# Patient Record
Sex: FEMALE | Race: BLACK OR AFRICAN AMERICAN | Employment: PART TIME | URBAN - METROPOLITAN AREA
[De-identification: names, ages, dates, MRNs, and addresses within clinical notes are randomized per-mention and may not be internally consistent; named-entity substitution may affect disease eponyms.]

---

## 2019-05-09 ENCOUNTER — ANESTHESIA (OUTPATIENT)
Dept: SURGERY | Age: 49
DRG: 340 | End: 2019-05-09
Payer: MEDICARE

## 2019-05-09 ENCOUNTER — HOSPITAL ENCOUNTER (INPATIENT)
Age: 49
LOS: 4 days | Discharge: HOME | DRG: 340 | End: 2019-05-13
Attending: EMERGENCY MEDICINE
Payer: MEDICARE

## 2019-05-09 ENCOUNTER — APPOINTMENT (OUTPATIENT)
Dept: CT IMAGING | Age: 49
DRG: 340 | End: 2019-05-09
Attending: PHYSICIAN ASSISTANT
Payer: MEDICARE

## 2019-05-09 ENCOUNTER — ANESTHESIA EVENT (OUTPATIENT)
Dept: SURGERY | Age: 49
DRG: 340 | End: 2019-05-09
Payer: MEDICARE

## 2019-05-09 ENCOUNTER — APPOINTMENT (OUTPATIENT)
Dept: GENERAL RADIOLOGY | Age: 49
DRG: 340 | End: 2019-05-09
Attending: PHYSICIAN ASSISTANT
Payer: MEDICARE

## 2019-05-09 DIAGNOSIS — K35.30 ACUTE APPENDICITIS WITH LOCALIZED PERITONITIS, WITHOUT PERFORATION, ABSCESS, OR GANGRENE: Primary | ICD-10-CM

## 2019-05-09 PROBLEM — K37 APPENDICITIS: Status: ACTIVE | Noted: 2019-05-09

## 2019-05-09 LAB
ALBUMIN SERPL-MCNC: 3.5 G/DL (ref 3.4–5)
ALBUMIN/GLOB SERPL: 0.7 {RATIO} (ref 0.8–1.7)
ALP SERPL-CCNC: 93 U/L (ref 45–117)
ALT SERPL-CCNC: 20 U/L (ref 13–56)
ANION GAP SERPL CALC-SCNC: 4 MMOL/L (ref 3–18)
AST SERPL-CCNC: 25 U/L (ref 15–37)
BASOPHILS # BLD: 0 K/UL (ref 0–0.1)
BASOPHILS NFR BLD: 0 % (ref 0–2)
BILIRUB SERPL-MCNC: 0.7 MG/DL (ref 0.2–1)
BUN SERPL-MCNC: 18 MG/DL (ref 7–18)
BUN/CREAT SERPL: 19 (ref 12–20)
CALCIUM SERPL-MCNC: 9.9 MG/DL (ref 8.5–10.1)
CHLORIDE SERPL-SCNC: 105 MMOL/L (ref 100–108)
CO2 SERPL-SCNC: 28 MMOL/L (ref 21–32)
CREAT SERPL-MCNC: 0.94 MG/DL (ref 0.6–1.3)
DIFFERENTIAL METHOD BLD: ABNORMAL
EOSINOPHIL # BLD: 0.1 K/UL (ref 0–0.4)
EOSINOPHIL NFR BLD: 1 % (ref 0–5)
ERYTHROCYTE [DISTWIDTH] IN BLOOD BY AUTOMATED COUNT: 14.2 % (ref 11.6–14.5)
EST. AVERAGE GLUCOSE BLD GHB EST-MCNC: 123 MG/DL
GLOBULIN SER CALC-MCNC: 4.7 G/DL (ref 2–4)
GLUCOSE BLD STRIP.AUTO-MCNC: 96 MG/DL (ref 70–110)
GLUCOSE SERPL-MCNC: 91 MG/DL (ref 74–99)
HBA1C MFR BLD: 5.9 % (ref 4.2–5.6)
HCT VFR BLD AUTO: 36.4 % (ref 35–45)
HGB BLD-MCNC: 12.3 G/DL (ref 12–16)
LACTATE BLD-SCNC: 0.98 MMOL/L (ref 0.4–2)
LIPASE SERPL-CCNC: 74 U/L (ref 73–393)
LYMPHOCYTES # BLD: 1.7 K/UL (ref 0.9–3.6)
LYMPHOCYTES NFR BLD: 14 % (ref 21–52)
MCH RBC QN AUTO: 28.1 PG (ref 24–34)
MCHC RBC AUTO-ENTMCNC: 33.8 G/DL (ref 31–37)
MCV RBC AUTO: 83.1 FL (ref 74–97)
MONOCYTES # BLD: 0.7 K/UL (ref 0.05–1.2)
MONOCYTES NFR BLD: 6 % (ref 3–10)
NEUTS SEG # BLD: 9.7 K/UL (ref 1.8–8)
NEUTS SEG NFR BLD: 79 % (ref 40–73)
PLATELET # BLD AUTO: 270 K/UL (ref 135–420)
PMV BLD AUTO: 9.9 FL (ref 9.2–11.8)
POTASSIUM SERPL-SCNC: 4.4 MMOL/L (ref 3.5–5.5)
PROT SERPL-MCNC: 8.2 G/DL (ref 6.4–8.2)
RBC # BLD AUTO: 4.38 M/UL (ref 4.2–5.3)
SODIUM SERPL-SCNC: 137 MMOL/L (ref 136–145)
WBC # BLD AUTO: 12.2 K/UL (ref 4.6–13.2)

## 2019-05-09 PROCEDURE — 77030018846 HC SOL IRR STRL H20 ICUM -A

## 2019-05-09 PROCEDURE — 77030008683 HC TU ET CUF COVD -A: Performed by: ANESTHESIOLOGY

## 2019-05-09 PROCEDURE — 77030009967 HC RELD STPLR ENDOSC J&J -C

## 2019-05-09 PROCEDURE — 85025 COMPLETE CBC W/AUTO DIFF WBC: CPT

## 2019-05-09 PROCEDURE — 74011250636 HC RX REV CODE- 250/636: Performed by: PHYSICIAN ASSISTANT

## 2019-05-09 PROCEDURE — 74177 CT ABD & PELVIS W/CONTRAST: CPT

## 2019-05-09 PROCEDURE — 77030002916 HC SUT ETHLN J&J -A

## 2019-05-09 PROCEDURE — 74011250636 HC RX REV CODE- 250/636

## 2019-05-09 PROCEDURE — 74011000258 HC RX REV CODE- 258: Performed by: PHYSICIAN ASSISTANT

## 2019-05-09 PROCEDURE — 74011636320 HC RX REV CODE- 636/320: Performed by: EMERGENCY MEDICINE

## 2019-05-09 PROCEDURE — 77030026438 HC STYL ET INTUB CARD -A: Performed by: ANESTHESIOLOGY

## 2019-05-09 PROCEDURE — 77030012406 HC DRN WND PENRS BARD -A

## 2019-05-09 PROCEDURE — 77030011265 HC ELECTRD BLD HEX COVD -A

## 2019-05-09 PROCEDURE — 77030009973 HC RELD STPLR J&J -C

## 2019-05-09 PROCEDURE — 93005 ELECTROCARDIOGRAM TRACING: CPT

## 2019-05-09 PROCEDURE — 77030011296 HC FCPS GRSP ENDOSC J&J -B

## 2019-05-09 PROCEDURE — 88304 TISSUE EXAM BY PATHOLOGIST: CPT

## 2019-05-09 PROCEDURE — 77030008566 HC TBNG SUC IRR J&J -B

## 2019-05-09 PROCEDURE — 76210000006 HC OR PH I REC 0.5 TO 1 HR

## 2019-05-09 PROCEDURE — 77030010351 HC TRCR ENDOSC VSTP COVD -B

## 2019-05-09 PROCEDURE — 83036 HEMOGLOBIN GLYCOSYLATED A1C: CPT

## 2019-05-09 PROCEDURE — 77030002933 HC SUT MCRYL J&J -A

## 2019-05-09 PROCEDURE — 74011000250 HC RX REV CODE- 250

## 2019-05-09 PROCEDURE — 77030020782 HC GWN BAIR PAWS FLX 3M -B

## 2019-05-09 PROCEDURE — 77030035220 HC TRCR ENDOSC BLNTPRT ANCHR COVD -B

## 2019-05-09 PROCEDURE — 83605 ASSAY OF LACTIC ACID: CPT

## 2019-05-09 PROCEDURE — 80053 COMPREHEN METABOLIC PANEL: CPT

## 2019-05-09 PROCEDURE — 82962 GLUCOSE BLOOD TEST: CPT

## 2019-05-09 PROCEDURE — 87040 BLOOD CULTURE FOR BACTERIA: CPT

## 2019-05-09 PROCEDURE — 0DTJ4ZZ RESECTION OF APPENDIX, PERCUTANEOUS ENDOSCOPIC APPROACH: ICD-10-PCS

## 2019-05-09 PROCEDURE — 65660000000 HC RM CCU STEPDOWN

## 2019-05-09 PROCEDURE — 77030013567 HC DRN WND RESERV BARD -A

## 2019-05-09 PROCEDURE — 77030008756 HC TU IRR SUC STRY -B

## 2019-05-09 PROCEDURE — 77030037051 HC TRCR ENDOSC VRSPRT BLD COVD -B

## 2019-05-09 PROCEDURE — 99283 EMERGENCY DEPT VISIT LOW MDM: CPT

## 2019-05-09 PROCEDURE — 76060000033 HC ANESTHESIA 1 TO 1.5 HR

## 2019-05-09 PROCEDURE — 77030031139 HC SUT VCRL2 J&J -A

## 2019-05-09 PROCEDURE — 77030020255 HC SOL INJ LR 1000ML BG

## 2019-05-09 PROCEDURE — 99284 EMERGENCY DEPT VISIT MOD MDM: CPT

## 2019-05-09 PROCEDURE — 77030034850

## 2019-05-09 PROCEDURE — 83690 ASSAY OF LIPASE: CPT

## 2019-05-09 PROCEDURE — 77030037892

## 2019-05-09 PROCEDURE — 77030018706 HC CORD MPLR COVD -A

## 2019-05-09 PROCEDURE — 76010000149 HC OR TIME 1 TO 1.5 HR

## 2019-05-09 PROCEDURE — 71045 X-RAY EXAM CHEST 1 VIEW: CPT

## 2019-05-09 PROCEDURE — 77030011810 HC STPLR ENDOSC J&J -G

## 2019-05-09 RX ORDER — ROCURONIUM BROMIDE 10 MG/ML
INJECTION, SOLUTION INTRAVENOUS AS NEEDED
Status: DISCONTINUED | OUTPATIENT
Start: 2019-05-09 | End: 2019-05-09 | Stop reason: HOSPADM

## 2019-05-09 RX ORDER — ACETAMINOPHEN 325 MG/1
650 TABLET ORAL
Status: DISCONTINUED | OUTPATIENT
Start: 2019-05-09 | End: 2019-05-13 | Stop reason: HOSPADM

## 2019-05-09 RX ORDER — MORPHINE SULFATE 2 MG/ML
INJECTION, SOLUTION INTRAMUSCULAR; INTRAVENOUS AS NEEDED
Status: DISCONTINUED | OUTPATIENT
Start: 2019-05-09 | End: 2019-05-09 | Stop reason: HOSPADM

## 2019-05-09 RX ORDER — BUPROPION HYDROCHLORIDE 150 MG/1
150 TABLET ORAL
COMMUNITY

## 2019-05-09 RX ORDER — LIDOCAINE HYDROCHLORIDE 20 MG/ML
INJECTION, SOLUTION EPIDURAL; INFILTRATION; INTRACAUDAL; PERINEURAL AS NEEDED
Status: DISCONTINUED | OUTPATIENT
Start: 2019-05-09 | End: 2019-05-09 | Stop reason: HOSPADM

## 2019-05-09 RX ORDER — ONDANSETRON 2 MG/ML
INJECTION INTRAMUSCULAR; INTRAVENOUS AS NEEDED
Status: DISCONTINUED | OUTPATIENT
Start: 2019-05-09 | End: 2019-05-09 | Stop reason: HOSPADM

## 2019-05-09 RX ORDER — MAGNESIUM SULFATE 100 %
4 CRYSTALS MISCELLANEOUS AS NEEDED
Status: DISCONTINUED | OUTPATIENT
Start: 2019-05-09 | End: 2019-05-13 | Stop reason: HOSPADM

## 2019-05-09 RX ORDER — HYDROMORPHONE HYDROCHLORIDE 2 MG/ML
0.5 INJECTION, SOLUTION INTRAMUSCULAR; INTRAVENOUS; SUBCUTANEOUS
Status: DISCONTINUED | OUTPATIENT
Start: 2019-05-09 | End: 2019-05-09 | Stop reason: HOSPADM

## 2019-05-09 RX ORDER — SODIUM CHLORIDE, SODIUM LACTATE, POTASSIUM CHLORIDE, CALCIUM CHLORIDE 600; 310; 30; 20 MG/100ML; MG/100ML; MG/100ML; MG/100ML
75 INJECTION, SOLUTION INTRAVENOUS CONTINUOUS
Status: DISCONTINUED | OUTPATIENT
Start: 2019-05-09 | End: 2019-05-09 | Stop reason: HOSPADM

## 2019-05-09 RX ORDER — SODIUM CHLORIDE, SODIUM LACTATE, POTASSIUM CHLORIDE, CALCIUM CHLORIDE 600; 310; 30; 20 MG/100ML; MG/100ML; MG/100ML; MG/100ML
INJECTION, SOLUTION INTRAVENOUS
Status: DISCONTINUED | OUTPATIENT
Start: 2019-05-09 | End: 2019-05-09 | Stop reason: HOSPADM

## 2019-05-09 RX ORDER — DIPHENHYDRAMINE HYDROCHLORIDE 50 MG/ML
12.5 INJECTION, SOLUTION INTRAMUSCULAR; INTRAVENOUS
Status: DISCONTINUED | OUTPATIENT
Start: 2019-05-09 | End: 2019-05-13 | Stop reason: HOSPADM

## 2019-05-09 RX ORDER — KETOROLAC TROMETHAMINE 30 MG/ML
INJECTION, SOLUTION INTRAMUSCULAR; INTRAVENOUS AS NEEDED
Status: DISCONTINUED | OUTPATIENT
Start: 2019-05-09 | End: 2019-05-09 | Stop reason: HOSPADM

## 2019-05-09 RX ORDER — MORPHINE SULFATE 10 MG/ML
5 INJECTION, SOLUTION INTRAMUSCULAR; INTRAVENOUS
Status: DISCONTINUED | OUTPATIENT
Start: 2019-05-09 | End: 2019-05-13 | Stop reason: HOSPADM

## 2019-05-09 RX ORDER — NEOSTIGMINE METHYLSULFATE 5 MG/5 ML
SYRINGE (ML) INTRAVENOUS AS NEEDED
Status: DISCONTINUED | OUTPATIENT
Start: 2019-05-09 | End: 2019-05-09 | Stop reason: HOSPADM

## 2019-05-09 RX ORDER — PROPOFOL 10 MG/ML
INJECTION, EMULSION INTRAVENOUS AS NEEDED
Status: DISCONTINUED | OUTPATIENT
Start: 2019-05-09 | End: 2019-05-09 | Stop reason: HOSPADM

## 2019-05-09 RX ORDER — DEXTROSE 50 % IN WATER (D50W) INTRAVENOUS SYRINGE
25-50 AS NEEDED
Status: DISCONTINUED | OUTPATIENT
Start: 2019-05-09 | End: 2019-05-13 | Stop reason: HOSPADM

## 2019-05-09 RX ORDER — METRONIDAZOLE 500 MG/100ML
500 INJECTION, SOLUTION INTRAVENOUS EVERY 8 HOURS
Status: DISCONTINUED | OUTPATIENT
Start: 2019-05-09 | End: 2019-05-13 | Stop reason: HOSPADM

## 2019-05-09 RX ORDER — CIPROFLOXACIN 2 MG/ML
400 INJECTION, SOLUTION INTRAVENOUS EVERY 12 HOURS
Status: DISCONTINUED | OUTPATIENT
Start: 2019-05-09 | End: 2019-05-13 | Stop reason: HOSPADM

## 2019-05-09 RX ORDER — CEFAZOLIN SODIUM 1 G/3ML
INJECTION, POWDER, FOR SOLUTION INTRAMUSCULAR; INTRAVENOUS AS NEEDED
Status: DISCONTINUED | OUTPATIENT
Start: 2019-05-09 | End: 2019-05-09 | Stop reason: HOSPADM

## 2019-05-09 RX ORDER — MIDAZOLAM HYDROCHLORIDE 1 MG/ML
INJECTION, SOLUTION INTRAMUSCULAR; INTRAVENOUS AS NEEDED
Status: DISCONTINUED | OUTPATIENT
Start: 2019-05-09 | End: 2019-05-09 | Stop reason: HOSPADM

## 2019-05-09 RX ORDER — SODIUM CHLORIDE 0.9 % (FLUSH) 0.9 %
5-40 SYRINGE (ML) INJECTION AS NEEDED
Status: DISCONTINUED | OUTPATIENT
Start: 2019-05-09 | End: 2019-05-09 | Stop reason: HOSPADM

## 2019-05-09 RX ORDER — GLYCOPYRROLATE 0.2 MG/ML
INJECTION INTRAMUSCULAR; INTRAVENOUS AS NEEDED
Status: DISCONTINUED | OUTPATIENT
Start: 2019-05-09 | End: 2019-05-09 | Stop reason: HOSPADM

## 2019-05-09 RX ORDER — INSULIN LISPRO 100 [IU]/ML
INJECTION, SOLUTION INTRAVENOUS; SUBCUTANEOUS
Status: DISCONTINUED | OUTPATIENT
Start: 2019-05-09 | End: 2019-05-13 | Stop reason: HOSPADM

## 2019-05-09 RX ORDER — OXYCODONE AND ACETAMINOPHEN 5; 325 MG/1; MG/1
1 TABLET ORAL
Status: DISCONTINUED | OUTPATIENT
Start: 2019-05-09 | End: 2019-05-13 | Stop reason: HOSPADM

## 2019-05-09 RX ORDER — DEXAMETHASONE SODIUM PHOSPHATE 4 MG/ML
INJECTION, SOLUTION INTRA-ARTICULAR; INTRALESIONAL; INTRAMUSCULAR; INTRAVENOUS; SOFT TISSUE AS NEEDED
Status: DISCONTINUED | OUTPATIENT
Start: 2019-05-09 | End: 2019-05-09 | Stop reason: HOSPADM

## 2019-05-09 RX ORDER — LORAZEPAM 2 MG/ML
1 INJECTION INTRAMUSCULAR
Status: DISCONTINUED | OUTPATIENT
Start: 2019-05-09 | End: 2019-05-13 | Stop reason: HOSPADM

## 2019-05-09 RX ORDER — FENTANYL CITRATE 50 UG/ML
INJECTION, SOLUTION INTRAMUSCULAR; INTRAVENOUS AS NEEDED
Status: DISCONTINUED | OUTPATIENT
Start: 2019-05-09 | End: 2019-05-09 | Stop reason: HOSPADM

## 2019-05-09 RX ORDER — DEXTROSE 50 % IN WATER (D50W) INTRAVENOUS SYRINGE
25-50 AS NEEDED
Status: DISCONTINUED | OUTPATIENT
Start: 2019-05-09 | End: 2019-05-09 | Stop reason: HOSPADM

## 2019-05-09 RX ORDER — ONDANSETRON 2 MG/ML
4 INJECTION INTRAMUSCULAR; INTRAVENOUS ONCE
Status: DISCONTINUED | OUTPATIENT
Start: 2019-05-09 | End: 2019-05-09 | Stop reason: HOSPADM

## 2019-05-09 RX ORDER — NALOXONE HYDROCHLORIDE 0.4 MG/ML
0.2 INJECTION, SOLUTION INTRAMUSCULAR; INTRAVENOUS; SUBCUTANEOUS AS NEEDED
Status: DISCONTINUED | OUTPATIENT
Start: 2019-05-09 | End: 2019-05-09 | Stop reason: HOSPADM

## 2019-05-09 RX ORDER — DEXTROSE, SODIUM CHLORIDE, AND POTASSIUM CHLORIDE 5; .45; .15 G/100ML; G/100ML; G/100ML
125 INJECTION INTRAVENOUS CONTINUOUS
Status: DISCONTINUED | OUTPATIENT
Start: 2019-05-09 | End: 2019-05-10

## 2019-05-09 RX ORDER — MAGNESIUM SULFATE 100 %
4 CRYSTALS MISCELLANEOUS AS NEEDED
Status: DISCONTINUED | OUTPATIENT
Start: 2019-05-09 | End: 2019-05-09 | Stop reason: HOSPADM

## 2019-05-09 RX ORDER — SODIUM CHLORIDE 0.9 % (FLUSH) 0.9 %
5-10 SYRINGE (ML) INJECTION AS NEEDED
Status: DISCONTINUED | OUTPATIENT
Start: 2019-05-09 | End: 2019-05-13 | Stop reason: HOSPADM

## 2019-05-09 RX ORDER — SUCCINYLCHOLINE CHLORIDE 20 MG/ML
INJECTION INTRAMUSCULAR; INTRAVENOUS AS NEEDED
Status: DISCONTINUED | OUTPATIENT
Start: 2019-05-09 | End: 2019-05-09 | Stop reason: HOSPADM

## 2019-05-09 RX ORDER — SODIUM CHLORIDE 0.9 % (FLUSH) 0.9 %
5-40 SYRINGE (ML) INJECTION EVERY 8 HOURS
Status: DISCONTINUED | OUTPATIENT
Start: 2019-05-09 | End: 2019-05-09 | Stop reason: HOSPADM

## 2019-05-09 RX ORDER — BUPIVACAINE HYDROCHLORIDE 2.5 MG/ML
INJECTION, SOLUTION EPIDURAL; INFILTRATION; INTRACAUDAL AS NEEDED
Status: DISCONTINUED | OUTPATIENT
Start: 2019-05-09 | End: 2019-05-09 | Stop reason: HOSPADM

## 2019-05-09 RX ADMIN — CIPROFLOXACIN 400 MG: 2 INJECTION, SOLUTION INTRAVENOUS at 23:38

## 2019-05-09 RX ADMIN — SUCCINYLCHOLINE CHLORIDE 120 MG: 20 INJECTION INTRAMUSCULAR; INTRAVENOUS at 20:14

## 2019-05-09 RX ADMIN — IOPAMIDOL 100 ML: 612 INJECTION, SOLUTION INTRAVENOUS at 17:50

## 2019-05-09 RX ADMIN — SODIUM CHLORIDE, SODIUM LACTATE, POTASSIUM CHLORIDE, CALCIUM CHLORIDE: 600; 310; 30; 20 INJECTION, SOLUTION INTRAVENOUS at 20:10

## 2019-05-09 RX ADMIN — PROPOFOL 160 MG: 10 INJECTION, EMULSION INTRAVENOUS at 20:14

## 2019-05-09 RX ADMIN — PIPERACILLIN SODIUM,TAZOBACTAM SODIUM 3.38 G: 3; .375 INJECTION, POWDER, FOR SOLUTION INTRAVENOUS at 18:54

## 2019-05-09 RX ADMIN — ROCURONIUM BROMIDE 5 MG: 10 INJECTION, SOLUTION INTRAVENOUS at 20:13

## 2019-05-09 RX ADMIN — ROCURONIUM BROMIDE 15 MG: 10 INJECTION, SOLUTION INTRAVENOUS at 20:25

## 2019-05-09 RX ADMIN — MIDAZOLAM HYDROCHLORIDE 2 MG: 1 INJECTION, SOLUTION INTRAMUSCULAR; INTRAVENOUS at 20:07

## 2019-05-09 RX ADMIN — ONDANSETRON 4 MG: 2 INJECTION INTRAMUSCULAR; INTRAVENOUS at 20:07

## 2019-05-09 RX ADMIN — FENTANYL CITRATE 50 MCG: 50 INJECTION, SOLUTION INTRAMUSCULAR; INTRAVENOUS at 20:13

## 2019-05-09 RX ADMIN — GLYCOPYRROLATE 0.3 MG: 0.2 INJECTION INTRAMUSCULAR; INTRAVENOUS at 21:04

## 2019-05-09 RX ADMIN — LIDOCAINE HYDROCHLORIDE 80 MG: 20 INJECTION, SOLUTION EPIDURAL; INFILTRATION; INTRACAUDAL; PERINEURAL at 20:14

## 2019-05-09 RX ADMIN — CEFAZOLIN SODIUM 2 G: 1 INJECTION, POWDER, FOR SOLUTION INTRAMUSCULAR; INTRAVENOUS at 20:10

## 2019-05-09 RX ADMIN — DEXAMETHASONE SODIUM PHOSPHATE 4 MG: 4 INJECTION, SOLUTION INTRA-ARTICULAR; INTRALESIONAL; INTRAMUSCULAR; INTRAVENOUS; SOFT TISSUE at 20:25

## 2019-05-09 RX ADMIN — Medication 2 MG: at 21:04

## 2019-05-09 RX ADMIN — MORPHINE SULFATE 4 MG: 2 INJECTION, SOLUTION INTRAMUSCULAR; INTRAVENOUS at 21:09

## 2019-05-09 RX ADMIN — GLYCOPYRROLATE 0.2 MG: 0.2 INJECTION INTRAMUSCULAR; INTRAVENOUS at 20:07

## 2019-05-09 RX ADMIN — SODIUM CHLORIDE, SODIUM LACTATE, POTASSIUM CHLORIDE, CALCIUM CHLORIDE: 600; 310; 30; 20 INJECTION, SOLUTION INTRAVENOUS at 20:59

## 2019-05-09 RX ADMIN — KETOROLAC TROMETHAMINE 30 MG: 30 INJECTION, SOLUTION INTRAMUSCULAR; INTRAVENOUS at 21:01

## 2019-05-09 RX ADMIN — METRONIDAZOLE 500 MG: 500 INJECTION, SOLUTION INTRAVENOUS at 22:35

## 2019-05-09 RX ADMIN — FENTANYL CITRATE 50 MCG: 50 INJECTION, SOLUTION INTRAMUSCULAR; INTRAVENOUS at 20:59

## 2019-05-09 RX ADMIN — SODIUM CHLORIDE 1000 ML: 900 INJECTION, SOLUTION INTRAVENOUS at 18:09

## 2019-05-09 NOTE — ED PROVIDER NOTES
[facility] EMERGENCY DEPT 
 
 
5:13 PM 
 
Date: 5/9/2019 Patient Name: Lindsay Ha History of Presenting Illness Chief Complaint Patient presents with  Abdominal Pain  Diarrhea  Chills  Vomiting 54 y.o. female with noted past medical history including gastric sleeve surgery, NIDDM, and Hypertension who presents to the emergency department with complaints of intermittent stabbing lower abdominal pain x 4 days with associated yellow vomiting and loose dark brown stool. Pain is exacerbated with inspiration and lying flat. She also notes a colicky pain worse with eating. She reports subjective fevers and chills. She denies hematuria, hematochezia, and recent travel. Patient denies any other associated signs or symptoms. Patient denies any other complaints. Nursing notes regarding the HPI and triage nursing notes were reviewed. Prior medical records were reviewed. Review of Systems Constitutional: Positive for chills and fever. HENT: Negative. Respiratory: Negative for shortness of breath and wheezing. Cardiovascular: Negative for chest pain and palpitations. Gastrointestinal: Positive for abdominal pain, diarrhea, nausea and vomiting. Negative for blood in stool. Genitourinary: Negative. Musculoskeletal: Negative. Neurological: Negative. Current Facility-Administered Medications Medication Dose Route Frequency Provider Last Rate Last Dose  sodium chloride (NS) flush 5-10 mL  5-10 mL IntraVENous PRN SUSAN Juarez      
 sodium chloride 0.9 % bolus infusion 244 mL  244 mL IntraVENous ONCE Haleigh Denny PA      
 piperacillin-tazobactam (ZOSYN) 3.375 g in 0.9% sodium chloride (MBP/ADV) 100 mL MBP  3.375 g IntraVENous Q6H Haleigh Denny PA Current Outpatient Medications Medication Sig Dispense Refill  polyethylene glycol (MIRALAX) 17 gram/dose powder Take 17 g by mouth daily. 1 tablespoon with 8 oz of water daily 17 g 0  
 oxyCODONE-acetaminophen (PERCOCET) 5-325 mg per tablet Take 1 tablet every 4-6 hours as needed for pain control. If you were instructed to try over the counter ibuprofen or tylenol, only take the percocet for pain not controlled with the over the counter medication. 12 Tab 0  METOPROLOL TARTRATE PO Take  by mouth daily.  HYDROCHLOROTHIAZIDE PO Take  by mouth daily.  ARIPIPRAZOLE (ABILIFY PO) Take  by mouth daily.  METFORMIN HCL (METFORMIN PO) Take  by mouth daily. Past History Past Medical History: 
Past Medical History:  
Diagnosis Date  Diabetes ([facility] [address]  Hypertension  Morbid obesity ([facility] [address]  Psychiatric disorder   
 phychotic  Rapid heart rate Past Surgical History: 
Past Surgical History:  
Procedure Laterality Date [address] UNLISTED  HX BREAST REDUCTION  4/16/2014 BREAST REDUCTION bilateral with inferior pedicles performed by Vamsi Spencer MD at [address] Family History: 
Family History Problem Relation Age of Onset  Cancer Maternal Aunt   
     colon/breast  
 Diabetes Maternal Aunt Social History: 
Social History Tobacco Use  Smoking status: Never Smoker Substance Use Topics  Alcohol use: Yes Comment: occassionally  Drug use: No  
 
 
Allergies: 
No Known Allergies Patient's primary care provider (as noted in EPIC):  Vashti Salcedo NP Review of Systems Constitutional: Positive for chills and fever. HENT: Negative. Respiratory: Negative for shortness of breath and wheezing. Cardiovascular: Negative for chest pain and palpitations. Gastrointestinal: Positive for abdominal pain, diarrhea, nausea and vomiting. Negative for blood in stool. Genitourinary: Negative. Musculoskeletal: Negative. Neurological: Negative. Visit Vitals /66 (BP 1 Location: Left arm, BP Patient Position: Head of bed elevated (Comment degrees); Supine) Pulse (!) 109 Temp 99.8 °F (37.7 °C) Resp 18 Wt 74.8 kg (165 lb) SpO2 100% BMI 33.33 kg/m² Patient Vitals for the past 12 hrs: 
 Temp Pulse Resp BP SpO2  
05/09/19 1830 99.8 °F (37.7 °C) (!) 109 18 105/66 100 % 05/09/19 1756 98.9 °F (37.2 °C)      
05/09/19 1605 100.4 °F (38 °C) (!) 110 16 133/86 100 % PHYSICAL EXAM: 
 
CONSTITUTIONAL:  Alert, in no apparent distress;  well developed;  well nourished. HEAD:  Normocephalic, atraumatic. EYES:  EOMI. Non-icteric sclera. Normal conjunctiva. ENTM:  Mouth: mucous membranes moist. 
NECK:  Supple RESPIRATORY:  Chest clear, equal breath sounds, good air movement. Without wheezes, rhonchi or rales. CARDIOVASCULAR:  Tachycardic with regular rhythm. No murmurs, rubs, or gallops. GI:  Normal bowel sounds, diffuse lower abdomen tender to palpation. No rebound or guarding. BACK:  Non-tender. NEURO:  Moves all four extremities, and grossly normal motor exam. 
SKIN:  No rashes;  Normal for age. PSYCH:  Alert and normal affect. DIFFERENTIAL DIAGNOSES/ MEDICAL DECISION MAKING: 
Gastritis, gerd, peptic ulcer disease, cholecystitis, pancreatitis, gastroenteritis, hepatitis, constipation related pain, appendicitis pain, diverticulitis, urinary tract infection, obstruction, abdominal wall pain, or combination of the above versus many other processes. In female patients, also consider ectopic pregnancy, pregnancy related pain, ovarian cyst pain, ovarian torsion, pelvic inflammatory disease, other sources of gyn pain such as uterine fibroids, versus combination of the above and/or numerous other processes/ etiologies. Recent Results (from the past 12 hour(s)) CBC WITH AUTOMATED DIFF Collection Time: 05/09/19  4:40 PM  
Result Value Ref Range WBC 12.2 4.6 - 13.2 K/uL  
 RBC 4.38 4.20 - 5.30 M/uL  
 HGB 12.3 12.0 - 16.0 g/dL HCT 36.4 35.0 - 45.0 % MCV 83.1 74.0 - 97.0 FL  
 MCH 28.1 24.0 - 34.0 PG  
 MCHC 33.8 31.0 - 37.0 g/dL  
 RDW 14.2 11.6 - 14.5 % PLATELET 674 914 - 988 K/uL MPV 9.9 9.2 - 11.8 FL  
 NEUTROPHILS 79 (H) 40 - 73 % LYMPHOCYTES 14 (L) 21 - 52 % MONOCYTES 6 3 - 10 % EOSINOPHILS 1 0 - 5 % BASOPHILS 0 0 - 2 %  
 ABS. NEUTROPHILS 9.7 (H) 1.8 - 8.0 K/UL  
 ABS. LYMPHOCYTES 1.7 0.9 - 3.6 K/UL  
 ABS. MONOCYTES 0.7 0.05 - 1.2 K/UL  
 ABS. EOSINOPHILS 0.1 0.0 - 0.4 K/UL  
 ABS. BASOPHILS 0.0 0.0 - 0.1 K/UL  
 DF AUTOMATED METABOLIC PANEL, COMPREHENSIVE Collection Time: 05/09/19  4:40 PM  
Result Value Ref Range Sodium 137 136 - 145 mmol/L Potassium 4.4 3.5 - 5.5 mmol/L Chloride 105 100 - 108 mmol/L  
 CO2 28 21 - 32 mmol/L Anion gap 4 3.0 - 18 mmol/L Glucose 91 74 - 99 mg/dL BUN 18 7.0 - 18 MG/DL Creatinine 0.94 0.6 - 1.3 MG/DL  
 BUN/Creatinine ratio 19 12 - 20 GFR est AA >60 >60 ml/min/1.73m2 GFR est non-AA >60 >60 ml/min/1.73m2 Calcium 9.9 8.5 - 10.1 MG/DL Bilirubin, total 0.7 0.2 - 1.0 MG/DL  
 ALT (SGPT) 20 13 - 56 U/L  
 AST (SGOT) 25 15 - 37 U/L Alk. phosphatase 93 45 - 117 U/L Protein, total 8.2 6.4 - 8.2 g/dL Albumin 3.5 3.4 - 5.0 g/dL Globulin 4.7 (H) 2.0 - 4.0 g/dL A-G Ratio 0.7 (L) 0.8 - 1.7 LIPASE Collection Time: 05/09/19  4:40 PM  
Result Value Ref Range Lipase 74 73 - 393 U/L  
POC LACTIC ACID Collection Time: 05/09/19  5:33 PM  
Result Value Ref Range Lactic Acid (POC) 0.98 0.40 - 2.00 mmol/L  
EKG, 12 LEAD, INITIAL Collection Time: 05/09/19  6:15 PM  
Result Value Ref Range Ventricular Rate 116 BPM  
 Atrial Rate 116 BPM  
 P-R Interval 138 ms QRS Duration 80 ms  
 Q-T Interval 322 ms QTC Calculation (Bezet) 447 ms Calculated P Axis 49 degrees Calculated R Axis 5 degrees Calculated T Axis 22 degrees Diagnosis Sinus tachycardia Otherwise normal ECG 
 When compared with ECG of 11-APR-2014 16:36, No significant change was found Ct Abd Pelv W Cont Result Date: 5/9/2019 EXAM:  CT Abdomen-Pelvis with Contrast. CLINICAL INDICATION:  Stabbing intermittent lower abdominal pain with loose stools and vomiting. History of gastric sleeve surgery. Pain exacerbation with inspiration and lying flat. COMPARISON:  None. TECHNIQUE:  - Helical volumetric CT imaging of the abdomen and pelvis is performed following IV contrast administration. Coronal and sagittal multiplanar reconstruction images are generated for improved anatomic delineation. - IV contrast 100 mL Isovue-300. - All CT scans at this facility are performed using dose optimization technique as appropriate to the performed exam, to include automated exposure control, adjustment of the mA and/or kV according to patient's size (Including appropriate matching for site-specific examinations), or use of iterative reconstruction technique. FINDINGS: CT Abdomen. Lung Bases:  Clear. Liver, Spleen, Adrenal Glands, Pancreas:  Unremarkable. Gallbladder:  Tiny gallstones. Kidneys:  Tiny hypodensity in the right kidney mid to lower interpolar region measuring about 0.5 x 0.4 cm (axial #35). Favor small renal cyst. GI Tract:  Mild hiatal hernia. Status post sleeve gastrectomy. No acute abnormalities in the proximal and mid small bowel segments. Nonspecific slight thickening of the terminal ileum with associated adjacent fat stranding. Abnormal appendiceal thickening, measuring up to about 0.9 cm (axial #60), associated with periappendiceal fat stranding. Slightly prominent right lower quadrant node measuring 1.0 x 0.6 and images (axial #55). Additional small loculated fluid collections adjacent to the appendix and terminal ileum, i.e. 1.0 x 1.0 cm (axial #58), 0.9 x 1.0 cm (axial #60), 1.4 x 1.1 cm (axial #63). No evidence of acute colitis or diverticulitis is detected.  Note:  No retroperitoneal adenopathy. Slight adam prominence along the pelvic sidewall. The largest of the pelvic sidewall nodes measures about 0.2 x 0.6 cm. Vascular:  No acute vascular abnormalities. CT Pelvis. Bladder:  Grossly unremarkable urinary bladder. Uterus:  Heterogeneous uterine parenchyma enhancement pattern. The significance of this pattern is unclear. Adnexa: At least 3 separate cystic foci in the left ovary, i.e. 4.2 x 3.3 cm (axial #64), 2.3 x 2.1 cm (axial #61), 3.4 x 2.9 cm (axial #66). Rectum:  Unremarkable. Skeletal Structures:  No osteoblastic or osteolytic lesions. IMPRESSION: 1. Abnormal appearance of the terminal ileum and appendix. Although intrinsic disease process involving the terminal ileum such as Crohn's disease cannot be entirely excluded, in the absence of significant involvement of the cecum probably decreases the likelihood of the Crohn's disease as the primary disease process. Instead, the most likely explanation is an acute appendicitis with reactive changes to the terminal ileum. At least 3 separate foci of small fluid collections are noted adjacent to the appendix. There is no significant enhancing rim to these tiny fluid collections. Doubtful for developing abscesses. No evidence of perforation. 2.  At least 3 separate cystic lesions involving the left ovary. Endometrioma or hemorrhagic cyst may be considered. Given the large size of the left ovary, correlation with ultrasound could be helpful to exclude ovarian torsion. Xr Chest [place] Result Date: 5/9/2019 EXAM: CHEST ONE VIEW  portable 1712 hours CLINICAL HISTORY/INDICATION: meets SIRS criteria intermittent stabbing lower abdominal pain x4 days associated with loose stools and vomiting, pain is exacerbated by inspiration and laying flat COMPARISON: None. TECHNIQUE: One view obtained.  FINDINGS: The cardiac and mediastinal silhouette is normal. The lungs are clear. Pulmonary vascularity is normal. The costophrenic angles are sharply defined. No bony abnormalities are seen. IMPRESSION: Negative chest.  
  
ED COURSE AND MEDICAL DECISION MAKIN:15pm General surgery paged. Consult 6:54 PM: I have discussed case with Dr. Quinten Rendon. Standard discussion regarding this patient's presenting symptoms, PMHX, exam, vital signs, available ancillary and diagnostic studies. Consulting MD states: He will accept the patient, but would like medicine to consult on the patient to control the blood pressure and diabetes. Consult 6:55 PM: I have discussed case with Dr. Steph Garnett, hospitalist.  Standard discussion regarding this patient's presenting symptoms, PMHX, exam, vital signs, available ancillary and diagnostic studies. Consulting MD states: He will consult on the patient, also requesting consult with GI. Consult 7:03 PM: I have discussed case with Dr. Tanja Bonilla, GI. Standard discussion regarding this patient's presenting symptoms, PMHX, exam, vital signs, available ancillary and diagnostic studies. Consulting MD states: he is refusing to see the patient unless Dr. Quinten Rendon calls him.  
 
7:06 PM Dr. Quinten Rendon and hospitalist are in the ED currently to see the patient. Diagnosis: 1. Acute appendicitis with localized peritonitis, without perforation, abscess, or gangrene Disposition: Admission Patient's Medications Start Taking No medications on file Continue Taking ARIPIPRAZOLE (ABILIFY PO)    Take  by mouth daily. HYDROCHLOROTHIAZIDE PO    Take  by mouth daily. METFORMIN HCL (METFORMIN PO)    Take  by mouth daily. METOPROLOL TARTRATE PO    Take  by mouth daily. OXYCODONE-ACETAMINOPHEN (PERCOCET) 5-325 MG PER TABLET    Take 1 tablet every 4-6 hours as needed for pain control.   If you were instructed to try over the counter ibuprofen or tylenol, only take the percocet for pain not controlled with the over the counter medication. POLYETHYLENE GLYCOL (MIRALAX) 17 GRAM/DOSE POWDER    Take 17 g by mouth daily. 1 tablespoon with 8 oz of water daily These Medications have changed No medications on file Stop Taking No medications on file SUSAN Huizar

## 2019-05-09 NOTE — CONSULTS
Consult Note    Patient: Ignacio Cox               Sex: female          DOA: 2019         YOB: 1970      Age:  54 y.o.        LOS:  LOS: 0 days              HPI:     Ignacio Cox is a 54 y.o. female who has been seen for medical management for DM and HTN while she is going for the Appendectomy by Dr. Leonel Beavers. Patient tells me that she has been having abdominal pain which started in the epigastrium 4 days ago. Now she feels pain in the RLQ area. She has N&V, diarrhea and shaking chills. She has not been able to eat anything. CT scan confirmed the acute appendicitis. Past Medical History:   Diagnosis Date    Diabetes ([address]     Hypertension     Morbid obesity ([facility] [address]     Psychiatric disorder     phychotic    Rapid heart rate        Past Surgical History:   Procedure Laterality Date    ABDOMEN SURGERY PROC UNLISTED      HX BREAST REDUCTION  2014    BREAST REDUCTION bilateral with inferior pedicles performed by Lorraine Grewal MD at [facility] MAIN OR    HX  SECTION         Family History   Problem Relation Age of Onset    Cancer Maternal Aunt         colon/breast    Diabetes Maternal Aunt        Social History     Socioeconomic History    Marital status: SINGLE     Spouse name: Not on file    Number of children: Not on file    Years of education: Not on file    Highest education level: Not on file   Tobacco Use    Smoking status: Never Smoker   Substance and Sexual Activity    Alcohol use: Yes     Comment: occassionally    Drug use: No    Sexual activity: Yes     Partners: Male       Prior to Admission medications    Medication Sig Start Date End Date Taking? Authorizing Provider   polyethylene glycol (MIRALAX) 17 gram/dose powder Take 17 g by mouth daily. 1 tablespoon with 8 oz of water daily 7/20/15   Kaylee Hernández MD   oxyCODONE-acetaminophen (PERCOCET) 5-325 mg per tablet Take 1 tablet every 4-6 hours as needed for pain control.   If you were instructed to try over the counter ibuprofen or tylenol, only take the percocet for pain not controlled with the over the counter medication. 7/20/15   Rebeca Jacobsen MD   METOPROLOL TARTRATE PO Take  by mouth daily. Provider, Historical   HYDROCHLOROTHIAZIDE PO Take  by mouth daily. Provider, Historical   ARIPIPRAZOLE (ABILIFY PO) Take  by mouth daily. Provider, Historical   METFORMIN HCL (METFORMIN PO) Take  by mouth daily. Provider, Historical       No Known Allergies    Review of Systems  Review of systems  General: No fevers or chills. Cardiovascular: No chest pain or pressure. No palpitations. Pulmonary: No shortness of breath, cough or wheeze. Gastrointestinal: + abdominal pain, nausea, vomiting & diarrhea. Genitourinary: No urinary frequency, urgency, hesitancy or dysuria. Musculoskeletal: No joint or muscle pain, no back pain, no recent trauma. Neurologic: No headache, numbness, tingling or weakness. Physical Exam:      Physical Exam:  General: WD, WN. Alert, cooperative, no acute distress    HEENT: NC, Atraumatic. PERRLA, anicteric sclerae. Lungs: CTA Bilaterally. No Wheezing/Rhonchi/Rales. Heart:  Regular  rhythm,  No murmur, No Rubs, No Gallops  Abdomen: Soft, Non distended, tender in the RLQ. .  +Bowel sounds, no HSM  Extremities: No c/c/e  Psych:   Good insight. Not anxious or agitated. Neurologic:  Alert and oriented X 3. No acute neurological deficit. Visit Vitals  /66 (BP 1 Location: Left arm, BP Patient Position: Head of bed elevated (Comment degrees); Supine)   Pulse (!) 109   Temp 99.8 °F (37.7 °C)   Resp 18   Wt 74.8 kg (165 lb)   SpO2 100%   BMI 33.33 kg/m²       Labs Reviewed:  Recent Results (from the past 24 hour(s))   CBC WITH AUTOMATED DIFF    Collection Time: 05/09/19  4:40 PM   Result Value Ref Range    WBC 12.2 4.6 - 13.2 K/uL    RBC 4.38 4.20 - 5.30 M/uL    HGB 12.3 12.0 - 16.0 g/dL    HCT 36.4 35.0 - 45.0 %    MCV 83.1 74.0 - 97.0 FL MCH 28.1 24.0 - 34.0 PG    MCHC 33.8 31.0 - 37.0 g/dL    RDW 14.2 11.6 - 14.5 %    PLATELET 921 874 - 298 K/uL    MPV 9.9 9.2 - 11.8 FL    NEUTROPHILS 79 (H) 40 - 73 %    LYMPHOCYTES 14 (L) 21 - 52 %    MONOCYTES 6 3 - 10 %    EOSINOPHILS 1 0 - 5 %    BASOPHILS 0 0 - 2 %    ABS. NEUTROPHILS 9.7 (H) 1.8 - 8.0 K/UL    ABS. LYMPHOCYTES 1.7 0.9 - 3.6 K/UL    ABS. MONOCYTES 0.7 0.05 - 1.2 K/UL    ABS. EOSINOPHILS 0.1 0.0 - 0.4 K/UL    ABS. BASOPHILS 0.0 0.0 - 0.1 K/UL    DF AUTOMATED     METABOLIC PANEL, COMPREHENSIVE    Collection Time: 05/09/19  4:40 PM   Result Value Ref Range    Sodium 137 136 - 145 mmol/L    Potassium 4.4 3.5 - 5.5 mmol/L    Chloride 105 100 - 108 mmol/L    CO2 28 21 - 32 mmol/L    Anion gap 4 3.0 - 18 mmol/L    Glucose 91 74 - 99 mg/dL    BUN 18 7.0 - 18 MG/DL    Creatinine 0.94 0.6 - 1.3 MG/DL    BUN/Creatinine ratio 19 12 - 20      GFR est AA >60 >60 ml/min/1.73m2    GFR est non-AA >60 >60 ml/min/1.73m2    Calcium 9.9 8.5 - 10.1 MG/DL    Bilirubin, total 0.7 0.2 - 1.0 MG/DL    ALT (SGPT) 20 13 - 56 U/L    AST (SGOT) 25 15 - 37 U/L    Alk.  phosphatase 93 45 - 117 U/L    Protein, total 8.2 6.4 - 8.2 g/dL    Albumin 3.5 3.4 - 5.0 g/dL    Globulin 4.7 (H) 2.0 - 4.0 g/dL    A-G Ratio 0.7 (L) 0.8 - 1.7     LIPASE    Collection Time: 05/09/19  4:40 PM   Result Value Ref Range    Lipase 74 73 - 393 U/L   HEMOGLOBIN A1C WITH EAG    Collection Time: 05/09/19  4:40 PM   Result Value Ref Range    Hemoglobin A1c 5.9 (H) 4.2 - 5.6 %    Est. average glucose 123 mg/dL   POC LACTIC ACID    Collection Time: 05/09/19  5:33 PM   Result Value Ref Range    Lactic Acid (POC) 0.98 0.40 - 2.00 mmol/L   EKG, 12 LEAD, INITIAL    Collection Time: 05/09/19  6:15 PM   Result Value Ref Range    Ventricular Rate 116 BPM    Atrial Rate 116 BPM    P-R Interval 138 ms    QRS Duration 80 ms    Q-T Interval 322 ms    QTC Calculation (Bezet) 447 ms    Calculated P Axis 49 degrees    Calculated R Axis 5 degrees    Calculated T Axis 22 degrees    Diagnosis       Sinus tachycardia  Otherwise normal ECG  When compared with ECG of 11-APR-2014 16:36,  No significant change was found     GLUCOSE, POC    Collection Time: 05/09/19  9:36 PM   Result Value Ref Range    Glucose (POC) 96 70 - 110 mg/dL         Assessment/Plan     Active Problems:      Appendicitis (5/9/2019)  - as per surgery. DM  - ISS    HTN  - resume home meds after surgery. Code status  - full code    DVT prophylaxis  - SCDs.

## 2019-05-09 NOTE — ROUTINE PROCESS
TRANSFER - OUT REPORT: 
 
Verbal report given to Matthieu Govea RN on [place]  being transferred to [address] [place] [address] for routine progression of care Report consisted of patients Situation, Background, Assessment and  
Recommendations(SBAR). Information from the following report(s) SBAR, Kardex and MAR was reviewed with the receiving nurse. Lines:  
Peripheral IV 05/09/19 Right Antecubital (Active) Site Assessment Clean, dry, & intact 5/9/2019  4:43 PM  
Phlebitis Assessment 0 5/9/2019  4:43 PM  
Infiltration Assessment 0 5/9/2019  4:43 PM  
Dressing Status Clean, dry, & intact 5/9/2019  4:43 PM  
Dressing Type 4 X 4;Tape;Transparent 5/9/2019  4:43 PM  
Hub Color/Line Status Pink;Patent; Flushed 5/9/2019  4:43 PM  
  
 
Opportunity for questions and clarification was provided. Patient transported with: 
 Monitor Tech

## 2019-05-10 LAB
ANION GAP SERPL CALC-SCNC: 4 MMOL/L (ref 3–18)
APPEARANCE UR: CLEAR
ATRIAL RATE: 116 BPM
BASOPHILS # BLD: 0 K/UL (ref 0–0.1)
BASOPHILS NFR BLD: 0 % (ref 0–2)
BILIRUB UR QL: NEGATIVE
BUN SERPL-MCNC: 7 MG/DL (ref 7–18)
BUN/CREAT SERPL: 11 (ref 12–20)
CALCIUM SERPL-MCNC: 8.3 MG/DL (ref 8.5–10.1)
CALCULATED P AXIS, ECG09: 49 DEGREES
CALCULATED R AXIS, ECG10: 5 DEGREES
CALCULATED T AXIS, ECG11: 22 DEGREES
CHLORIDE SERPL-SCNC: 106 MMOL/L (ref 100–108)
CO2 SERPL-SCNC: 26 MMOL/L (ref 21–32)
COLOR UR: YELLOW
CREAT SERPL-MCNC: 0.63 MG/DL (ref 0.6–1.3)
DIAGNOSIS, 93000: NORMAL
DIFFERENTIAL METHOD BLD: ABNORMAL
EOSINOPHIL # BLD: 0 K/UL (ref 0–0.4)
EOSINOPHIL NFR BLD: 0 % (ref 0–5)
ERYTHROCYTE [DISTWIDTH] IN BLOOD BY AUTOMATED COUNT: 14.5 % (ref 11.6–14.5)
GLUCOSE BLD STRIP.AUTO-MCNC: 121 MG/DL (ref 70–110)
GLUCOSE BLD STRIP.AUTO-MCNC: 137 MG/DL (ref 70–110)
GLUCOSE BLD STRIP.AUTO-MCNC: 145 MG/DL (ref 70–110)
GLUCOSE BLD STRIP.AUTO-MCNC: 163 MG/DL (ref 70–110)
GLUCOSE SERPL-MCNC: 195 MG/DL (ref 74–99)
GLUCOSE UR STRIP.AUTO-MCNC: NEGATIVE MG/DL
HCT VFR BLD AUTO: 33.2 % (ref 35–45)
HGB BLD-MCNC: 10.6 G/DL (ref 12–16)
HGB UR QL STRIP: NEGATIVE
KETONES UR QL STRIP.AUTO: 40 MG/DL
LEUKOCYTE ESTERASE UR QL STRIP.AUTO: NEGATIVE
LYMPHOCYTES # BLD: 0.4 K/UL (ref 0.9–3.6)
LYMPHOCYTES NFR BLD: 4 % (ref 21–52)
MCH RBC QN AUTO: 26.9 PG (ref 24–34)
MCHC RBC AUTO-ENTMCNC: 31.9 G/DL (ref 31–37)
MCV RBC AUTO: 84.3 FL (ref 74–97)
MONOCYTES # BLD: 0.5 K/UL (ref 0.05–1.2)
MONOCYTES NFR BLD: 4 % (ref 3–10)
NEUTS SEG # BLD: 10.4 K/UL (ref 1.8–8)
NEUTS SEG NFR BLD: 92 % (ref 40–73)
NITRITE UR QL STRIP.AUTO: NEGATIVE
P-R INTERVAL, ECG05: 138 MS
PH UR STRIP: 5 [PH] (ref 5–8)
PLATELET # BLD AUTO: 255 K/UL (ref 135–420)
PMV BLD AUTO: 10.3 FL (ref 9.2–11.8)
POTASSIUM SERPL-SCNC: 4.4 MMOL/L (ref 3.5–5.5)
PROT UR STRIP-MCNC: NEGATIVE MG/DL
Q-T INTERVAL, ECG07: 322 MS
QRS DURATION, ECG06: 80 MS
QTC CALCULATION (BEZET), ECG08: 447 MS
RBC # BLD AUTO: 3.94 M/UL (ref 4.2–5.3)
SODIUM SERPL-SCNC: 136 MMOL/L (ref 136–145)
SP GR UR REFRACTOMETRY: >1.03 (ref 1–1.03)
UROBILINOGEN UR QL STRIP.AUTO: 1 EU/DL (ref 0.2–1)
VENTRICULAR RATE, ECG03: 116 BPM
WBC # BLD AUTO: 11.3 K/UL (ref 4.6–13.2)

## 2019-05-10 PROCEDURE — 74011250636 HC RX REV CODE- 250/636

## 2019-05-10 PROCEDURE — 74011250636 HC RX REV CODE- 250/636: Performed by: INTERNAL MEDICINE

## 2019-05-10 PROCEDURE — 85025 COMPLETE CBC W/AUTO DIFF WBC: CPT

## 2019-05-10 PROCEDURE — 36415 COLL VENOUS BLD VENIPUNCTURE: CPT

## 2019-05-10 PROCEDURE — 80048 BASIC METABOLIC PNL TOTAL CA: CPT

## 2019-05-10 PROCEDURE — 77030027138 HC INCENT SPIROMETER -A

## 2019-05-10 PROCEDURE — 74011250637 HC RX REV CODE- 250/637

## 2019-05-10 PROCEDURE — 65660000000 HC RM CCU STEPDOWN

## 2019-05-10 PROCEDURE — 82962 GLUCOSE BLOOD TEST: CPT

## 2019-05-10 PROCEDURE — 74011636637 HC RX REV CODE- 636/637: Performed by: INTERNAL MEDICINE

## 2019-05-10 PROCEDURE — 81003 URINALYSIS AUTO W/O SCOPE: CPT

## 2019-05-10 RX ORDER — SODIUM CHLORIDE 9 MG/ML
1000 INJECTION, SOLUTION INTRAVENOUS ONCE
Status: COMPLETED | OUTPATIENT
Start: 2019-05-10 | End: 2019-05-10

## 2019-05-10 RX ORDER — SODIUM CHLORIDE 9 MG/ML
125 INJECTION, SOLUTION INTRAVENOUS CONTINUOUS
Status: DISPENSED | OUTPATIENT
Start: 2019-05-10 | End: 2019-05-11

## 2019-05-10 RX ADMIN — OXYCODONE HYDROCHLORIDE AND ACETAMINOPHEN 1 TABLET: 5; 325 TABLET ORAL at 12:35

## 2019-05-10 RX ADMIN — CIPROFLOXACIN 400 MG: 2 INJECTION, SOLUTION INTRAVENOUS at 12:11

## 2019-05-10 RX ADMIN — METRONIDAZOLE 500 MG: 500 INJECTION, SOLUTION INTRAVENOUS at 23:10

## 2019-05-10 RX ADMIN — CIPROFLOXACIN 400 MG: 2 INJECTION, SOLUTION INTRAVENOUS at 23:10

## 2019-05-10 RX ADMIN — OXYCODONE HYDROCHLORIDE AND ACETAMINOPHEN 1 TABLET: 5; 325 TABLET ORAL at 19:06

## 2019-05-10 RX ADMIN — SODIUM CHLORIDE 1000 ML: 900 INJECTION, SOLUTION INTRAVENOUS at 06:37

## 2019-05-10 RX ADMIN — OXYCODONE HYDROCHLORIDE AND ACETAMINOPHEN 1 TABLET: 5; 325 TABLET ORAL at 23:10

## 2019-05-10 RX ADMIN — INSULIN LISPRO 2 UNITS: 100 INJECTION, SOLUTION INTRAVENOUS; SUBCUTANEOUS at 07:55

## 2019-05-10 RX ADMIN — SODIUM CHLORIDE 125 ML/HR: 900 INJECTION, SOLUTION INTRAVENOUS at 07:41

## 2019-05-10 RX ADMIN — DEXTROSE MONOHYDRATE, SODIUM CHLORIDE, AND POTASSIUM CHLORIDE 125 ML/HR: 50; 4.5; 1.49 INJECTION, SOLUTION INTRAVENOUS at 00:46

## 2019-05-10 RX ADMIN — METRONIDAZOLE 500 MG: 500 INJECTION, SOLUTION INTRAVENOUS at 07:58

## 2019-05-10 RX ADMIN — METRONIDAZOLE 500 MG: 500 INJECTION, SOLUTION INTRAVENOUS at 15:09

## 2019-05-10 NOTE — H&P
[address] [place] [address]  HISTORY AND PHYSICAL Name:  Colleen Mccormick 
MR#:   [id number] :  1970 ACCOUNT #:  [de-identified] ADMIT DATE:  2019 CHIEF COMPLAINT:  Abdominal pain, diarrhea, nausea and vomiting. HISTORY OF PRESENT ILLNESS:  The patient is a 51-year-old woman who presented to the emergency room with these complaints few hours ago. She has had these for 3-4 days. Had some vomiting, but it has been progressive and worsening. Now, she has trouble lying flat or riding in a car because of the tenderness. She reports some fevers, and her temperature here was 99.8 on presentation with a heart rate of 109. She has had several surgical interventions, one is a gastric sleeve 3 years ago and one is some form of right ovarian surgery 3 or 4 years ago. PAST MEDICAL HISTORY:  Includes diabetes, hypertension, morbid obesity, psychosis of some form, and rapid heart rate. ALLERGIES:  SHE HAS NO KNOWN MEDICAL ALLERGIES. CURRENT MEDICATIONS:  She takes MiraLax; Percocet; metoprolol, unknown dose, she thinks it is 10; hydrochlorothiazide, unknown dose; Abilify; and metformin, unknown dose, at home. SOCIAL HISTORY:  She is an occasional drinker, nonsmoker. FAMILY HISTORY:  Positive for breast cancer and diabetes. REVIEW OF SYSTEMS:  Positive for fever and chills. Negative for HEENT complaints. Negative for respiratory complaints. Negative for cardiac complaints. Positive for abdominal pain, nausea and diarrhea. Negative for  complaints. Negative for musculoskeletal complaints. Negative for neurologic complaints. Negative for psychiatric complaints. Negative for dermatologic complaints. PHYSICAL EXAMINATION: 
GENERAL:  She is a well-developed, well-nourished, morbidly obese black female, in no apparent distress. VITAL SIGNS:  Her vitals were as above. HEENT AND NECK:  Normocephalic, atraumatic.   Her pupils are equal. Sclerae are anicteric. Nose and throat are clear. CHEST:  Clear bilaterally. HEART:  Heart has a regular rate and rhythm, but tachycardic. ABDOMEN:  Obese, soft, tender in her right lower quadrant. She has old healed laparoscopic scars. LABORATORY DATA:  She was seen in the emergency room and worked up with a laboratory data which shows white count of 12.2 and H and H of 12.3 and 36.4, and platelet count of 439. She has a very mild left shift. She has normal electrolytes. Her CT scan, however, shows inflammatory changes around the ileocecal area with a boggy inflamed appendix and periappendiceal fat stranding. IMPRESSION:  Acute appendicitis. PLAN:  Laparoscopic appendectomy. Lexii Topete MD 
 
 
[id number] 
D:  05/09/2019 19:29 
T:  05/09/2019 22:15 JOB #:  [id number]

## 2019-05-10 NOTE — PROGRESS NOTES
Reason for Admission:  Appendicitis Kimberly Baer RRAT Score:    9 Plan for utilizing home health:    No  
                 
Likelihood of Readmission:   LOW Transition of Care Plan:         
 
 
Initial assessment completed with patient. Cognitive status of patient: oriented to time, place, person and situation. Face sheet information confirmed:  yes. The patient designates her mother Vincent Martin to participate in her discharge plan and to receive any needed information. This patient lives in a apartment by self Patient is able to navigate steps as needed. Prior to hospitalization, patient was considered to be independent with ADLs/IADLS : yes . Patient has a current ACP document on file: no The mother will be available to transport patient home upon discharge. The patient already has no  medical equipment available in the home. Patient is not currently active with home health. Patient has not stayed in a skilled nursing facility or rehab. Was  stay within last 60 days : no. This patient is on dialysis :no 
 Currently, the discharge plan is home The patient states that she can obtain her medications from the pharmacy, and take her medications as directed. Patient's current insurance is [employer], [facility] of [place] Care Management Interventions PCP Verified by CM: Yes(per pt, she saw her pcp 3 months ago) Palliative Care Criteria Met (RRAT>21 & CHF Dx)?: No 
Mode of Transport at Discharge: Other (see comment)(pt's mother will be picking her up) Transition of Care Consult (CM Consult): Discharge Planning Physical Therapy Consult: No 
Occupational Therapy Consult: No 
Speech Therapy Consult: No 
Current Support Network: Lives Alone, Family Lives [place] Confirm Follow Up Transport: Self Plan discussed with Pt/Family/Caregiver: Yes Discharge Location Discharge Placement: Home GAVIN Melendez RN Care Management Pager: [phone]

## 2019-05-10 NOTE — DIABETES MGMT
GLYCEMIC CONTROL PLAN OF CARE Assessment/Recommendations: 
Pt is a 54year old woman with a past medical history significant for diabetes and hypertension. Continue correctional Lispro insulin coverage. Continue inpatient monitoring and intervention. Most recent blood glucose values: 96, 163 mg/dL Current A1C of 5.9% is equivalent to average blood glucose of 123 mg/dl over the past 2-3 months. Current hospital diabetes medications:  
Correctional Lispro insulin 4 times daily ACHS Home diabetes medications: 
Metformin Diet:  Clear liquid Education:  ____Refer to Diabetes Education Record __x__Education not indicated at this time Kiya Glynn RD, CDE

## 2019-05-10 NOTE — PROGRESS NOTES
Surgery Looks much better than anticipated POD 1 s/p lap appy for perf appy and abscess AF VSS   HERNANDO 105 cc Soft abd exam, wounds ok Labs reviewed and ok Glu 195 Thanks to Hospitalist for med mgnt. D/c payan, IS OOB, cont abx

## 2019-05-10 NOTE — ANESTHESIA POSTPROCEDURE EVALUATION
Procedure(s): 
APPENDECTOMY LAPAROSCOPIC. general 
 
Anesthesia Post Evaluation Multimodal analgesia: multimodal analgesia used between 6 hours prior to anesthesia start to PACU discharge Patient location during evaluation: bedside Patient participation: complete - patient participated Level of consciousness: awake Pain score: 0 Pain management: adequate Airway patency: patent Anesthetic complications: no 
Cardiovascular status: acceptable Respiratory status: acceptable Hydration status: acceptable Post anesthesia nausea and vomiting:  none Vitals Value Taken Time /69 5/9/2019  9:34 PM  
Temp 36.9 °C (98.4 °F) 5/9/2019  9:34 PM  
Pulse 98 5/9/2019  9:35 PM  
Resp 27 5/9/2019  9:35 PM  
SpO2 96 % 5/9/2019  9:35 PM  
Vitals shown include unvalidated device data.

## 2019-05-10 NOTE — PROGRESS NOTES
[facility] Patient: Jesus [place] Age: 54 y.o. : 1970 MR#: [id number] SSN: [id number] Date: 5/10/2019 Subjective/24-hour events:  
 
Feeling significantly better overall. Does complain of some mild abdominal cramping but denies N/V. Has tolerated some monica crackers and PO liquids this AM without difficulty. Assessment:  
Perforated appendicitis s/p laparoscopic appendectomy HTN 
DM 2 Plan: 
ABX, IVF as ordered. Blood sugars well-controlled. SSI as ordered, monitor. Mobilize as able/tolerated. Diet advancement, wound care and other routine postoperative surgical management per primary team. 
Following. Case discussed with:  [x]Patient  []Family  [x]Nursing  []Case Management DVT Prophylaxis:  []Lovenox  []Hep SQ  [x]SCDs  []Coumadin   []On Heparin gtt Objective:  
VS:  
Visit Vitals /67 (BP 1 Location: Left arm, BP Patient Position: Sitting) Pulse 74 Temp 99 °F (37.2 °C) Resp 16 Wt 78.3 kg (172 lb 9.6 oz) SpO2 100% BMI 34.86 kg/m² Tmax/24hrs: Temp (24hrs), Av.6 °F (37 °C), Min:96.5 °F (35.8 °C), Max:100.4 °F (38 °C) Intake/Output Summary (Last 24 hours) at 5/10/2019 1054 Last data filed at 5/10/2019 0597 Gross per 24 hour Intake 1670 ml Output 1555 ml Net 115 ml General:  In NAD. Nontoxic-appearing. Cardiovascular:  RRR. Pulmonary:  Clear, no wheezes. Effort nonlabored. GI:  Abdomen soft, NTTP. Extremities:  Warm, no ischemia. Neuro:  Awake and alert, motor grossly nonfocal. 
 
Labs:   
Recent Results (from the past 24 hour(s)) CBC WITH AUTOMATED DIFF Collection Time: 19  4:40 PM  
Result Value Ref Range WBC 12.2 4.6 - 13.2 K/uL  
 RBC 4.38 4.20 - 5.30 M/uL  
 HGB 12.3 12.0 - 16.0 g/dL HCT 36.4 35.0 - 45.0 % MCV 83.1 74.0 - 97.0 FL  
 MCH 28.1 24.0 - 34.0 PG  
 MCHC 33.8 31.0 - 37.0 g/dL  
 RDW 14.2 11.6 - 14.5 % PLATELET 443 513 - 086 K/uL MPV 9.9 9.2 - 11.8 FL  
 NEUTROPHILS 79 (H) 40 - 73 % LYMPHOCYTES 14 (L) 21 - 52 % MONOCYTES 6 3 - 10 % EOSINOPHILS 1 0 - 5 % BASOPHILS 0 0 - 2 %  
 ABS. NEUTROPHILS 9.7 (H) 1.8 - 8.0 K/UL  
 ABS. LYMPHOCYTES 1.7 0.9 - 3.6 K/UL  
 ABS. MONOCYTES 0.7 0.05 - 1.2 K/UL  
 ABS. EOSINOPHILS 0.1 0.0 - 0.4 K/UL  
 ABS. BASOPHILS 0.0 0.0 - 0.1 K/UL  
 DF AUTOMATED METABOLIC PANEL, COMPREHENSIVE Collection Time: 05/09/19  4:40 PM  
Result Value Ref Range Sodium 137 136 - 145 mmol/L Potassium 4.4 3.5 - 5.5 mmol/L Chloride 105 100 - 108 mmol/L  
 CO2 28 21 - 32 mmol/L Anion gap 4 3.0 - 18 mmol/L Glucose 91 74 - 99 mg/dL BUN 18 7.0 - 18 MG/DL Creatinine 0.94 0.6 - 1.3 MG/DL  
 BUN/Creatinine ratio 19 12 - 20 GFR est AA >60 >60 ml/min/1.73m2 GFR est non-AA >60 >60 ml/min/1.73m2 Calcium 9.9 8.5 - 10.1 MG/DL Bilirubin, total 0.7 0.2 - 1.0 MG/DL  
 ALT (SGPT) 20 13 - 56 U/L  
 AST (SGOT) 25 15 - 37 U/L Alk. phosphatase 93 45 - 117 U/L Protein, total 8.2 6.4 - 8.2 g/dL Albumin 3.5 3.4 - 5.0 g/dL Globulin 4.7 (H) 2.0 - 4.0 g/dL A-G Ratio 0.7 (L) 0.8 - 1.7 LIPASE Collection Time: 05/09/19  4:40 PM  
Result Value Ref Range Lipase 74 73 - 393 U/L  
HEMOGLOBIN A1C WITH EAG Collection Time: 05/09/19  4:40 PM  
Result Value Ref Range Hemoglobin A1c 5.9 (H) 4.2 - 5.6 % Est. average glucose 123 mg/dL CULTURE, BLOOD Collection Time: 05/09/19  5:21 PM  
Result Value Ref Range Special Requests: NO SPECIAL REQUESTS Culture result: NO GROWTH AFTER 13 HOURS    
POC LACTIC ACID Collection Time: 05/09/19  5:33 PM  
Result Value Ref Range Lactic Acid (POC) 0.98 0.40 - 2.00 mmol/L  
CULTURE, BLOOD Collection Time: 05/09/19  5:50 PM  
Result Value Ref Range Special Requests: NO SPECIAL REQUESTS Culture result: NO GROWTH AFTER 12 HOURS    
EKG, 12 LEAD, INITIAL Collection Time: 05/09/19  6:15 PM  
Result Value Ref Range Ventricular Rate 116 BPM  
 Atrial Rate 116 BPM  
 P-R Interval 138 ms QRS Duration 80 ms  
 Q-T Interval 322 ms QTC Calculation (Bezet) 447 ms Calculated P Axis 49 degrees Calculated R Axis 5 degrees Calculated T Axis 22 degrees Diagnosis Sinus tachycardia Otherwise normal ECG When compared with ECG of 11-APR-2014 16:36, No significant change was found GLUCOSE, POC Collection Time: 05/09/19  9:36 PM  
Result Value Ref Range Glucose (POC) 96 70 - 110 mg/dL CBC WITH AUTOMATED DIFF Collection Time: 05/10/19  4:50 AM  
Result Value Ref Range WBC 11.3 4.6 - 13.2 K/uL  
 RBC 3.94 (L) 4.20 - 5.30 M/uL  
 HGB 10.6 (L) 12.0 - 16.0 g/dL HCT 33.2 (L) 35.0 - 45.0 % MCV 84.3 74.0 - 97.0 FL  
 MCH 26.9 24.0 - 34.0 PG  
 MCHC 31.9 31.0 - 37.0 g/dL  
 RDW 14.5 11.6 - 14.5 % PLATELET 708 440 - 264 K/uL MPV 10.3 9.2 - 11.8 FL  
 NEUTROPHILS 92 (H) 40 - 73 % LYMPHOCYTES 4 (L) 21 - 52 % MONOCYTES 4 3 - 10 % EOSINOPHILS 0 0 - 5 % BASOPHILS 0 0 - 2 %  
 ABS. NEUTROPHILS 10.4 (H) 1.8 - 8.0 K/UL  
 ABS. LYMPHOCYTES 0.4 (L) 0.9 - 3.6 K/UL  
 ABS. MONOCYTES 0.5 0.05 - 1.2 K/UL  
 ABS. EOSINOPHILS 0.0 0.0 - 0.4 K/UL  
 ABS. BASOPHILS 0.0 0.0 - 0.1 K/UL  
 DF AUTOMATED METABOLIC PANEL, BASIC Collection Time: 05/10/19  4:50 AM  
Result Value Ref Range Sodium 136 136 - 145 mmol/L Potassium 4.4 3.5 - 5.5 mmol/L Chloride 106 100 - 108 mmol/L  
 CO2 26 21 - 32 mmol/L Anion gap 4 3.0 - 18 mmol/L Glucose 195 (H) 74 - 99 mg/dL BUN 7 7.0 - 18 MG/DL Creatinine 0.63 0.6 - 1.3 MG/DL  
 BUN/Creatinine ratio 11 (L) 12 - 20 GFR est AA >60 >60 ml/min/1.73m2 GFR est non-AA >60 >60 ml/min/1.73m2 Calcium 8.3 (L) 8.5 - 10.1 MG/DL URINALYSIS W/ RFLX MICROSCOPIC Collection Time: 05/10/19  5:45 AM  
Result Value Ref Range Color YELLOW Appearance CLEAR Specific gravity >1.030 (H) 1.005 - 1.030  
 pH (UA) 5.0 5.0 - 8.0 Protein NEGATIVE  NEG mg/dL Glucose NEGATIVE  NEG mg/dL Ketone 40 (A) NEG mg/dL Bilirubin NEGATIVE  NEG Blood NEGATIVE  NEG Urobilinogen 1.0 0.2 - 1.0 EU/dL Nitrites NEGATIVE  NEG Leukocyte Esterase NEGATIVE  NEG    
GLUCOSE, POC Collection Time: 05/10/19  7:05 AM  
Result Value Ref Range Glucose (POC) 163 (H) 70 - 110 mg/dL Signed By: Raman Dinero MD   
 May 10, 2019

## 2019-05-10 NOTE — PROGRESS NOTES
Patient BP 93/55, MD notified. D5 1/2 NS with K d/c and order for bolus of 1000 ml NS followed by NS at 125 received.

## 2019-05-10 NOTE — ROUTINE PROCESS
TRANSFER - OUT REPORT: 
 
Verbal report given to Saw Moaida on Les Done  being transferred to room 410 for routine progression of care Report consisted of patients Situation, Background, Assessment and  
Recommendations(SBAR). Information from the following report(s) SBAR, OR Summary, MAR and Recent Results was reviewed with the receiving nurse. Lines:  
Peripheral IV 05/09/19 Right Antecubital (Active) Site Assessment Clean, dry, & intact 5/9/2019  9:34 PM  
Phlebitis Assessment 0 5/9/2019  9:34 PM  
Infiltration Assessment 0 5/9/2019  9:34 PM  
Dressing Status Clean, dry, & intact 5/9/2019  9:34 PM  
Dressing Type Tape;Transparent 5/9/2019  9:34 PM  
Hub Color/Line Status Pink; Infusing 5/9/2019  9:34 PM  
  
 
Opportunity for questions and clarification was provided. Patient transported with: 
 O2 @ 2 liters Registered Nurse Tech

## 2019-05-10 NOTE — BRIEF OP NOTE
BRIEF OPERATIVE NOTE Date of Procedure: 5/9/2019 Preoperative Diagnosis: DX Acute Appendicitis Postoperative Diagnosis: DX Acute Appendicitis Procedure(s): 
APPENDECTOMY LAPAROSCOPIC Surgeon(s) and Role: Amado Phillips MD - Primary Surgical Assistant:  
 
Surgical Staff: 
Circ-1: Gayatri Graham RN Scrub Tech-1: Eriberto Mendenhall Surg Asst-1: Navdeep Huntley Event Time In Time Out Incision Start 2025 Incision Close Anesthesia: General  
Estimated Blood Loss: min Specimens:  
ID Type Source Tests Collected by Time Destination 1 : Appendix Preservative Appendix  Jayla Fitzgerald MD 5/9/2019 2057 Pathology Findings: perf appy with phlegon and abscess Complications: 0 Implants: * No implants in log *

## 2019-05-10 NOTE — ANESTHESIA PREPROCEDURE EVALUATION
Relevant Problems No relevant active problems Anesthetic History No history of anesthetic complications Review of Systems / Medical History Patient summary reviewed and pertinent labs reviewed Pulmonary Within defined limits Neuro/Psych Psychiatric history Cardiovascular Hypertension: well controlled GI/Hepatic/Renal 
Within defined limits Endo/Other Diabetes: type 2 Other Findings Physical Exam 
 
Airway Mallampati: II 
TM Distance: 4 - 6 cm Neck ROM: normal range of motion Mouth opening: Normal 
 
 Cardiovascular Dental 
No notable dental hx Pulmonary Abdominal 
GI exam deferred Other Findings Anesthetic Plan ASA: 3, emergent Anesthesia type: general 
 
 
 
 
Induction: Intravenous Anesthetic plan and risks discussed with: Patient

## 2019-05-10 NOTE — ROUTINE PROCESS
Bedside shift change report given to Jhony Koehler (oncoming nurse) by Nicolas Sellers (offgoing nurse). Report included the following information SBAR, Intake/Output, MAR and Recent Results.

## 2019-05-10 NOTE — ROUTINE PROCESS
TRANSFER - IN REPORT: 
 
Verbal report received from Nica(name) on Arnulfo Pang  being received from PACU(unit) for routine progression of care Report consisted of patients Situation, Background, Assessment and  
Recommendations(SBAR). Information from the following report(s) SBAR, Procedure Summary, Intake/Output and MAR was reviewed with the receiving nurse. Opportunity for questions and clarification was provided. Assessment completed upon patients arrival to unit and care assumed.

## 2019-05-10 NOTE — ROUTINE PROCESS
1515 Bedside and Verbal shift change report given to Ginger Carballo  by Corwin Mcarthur. Oli,RN  Report included the following information SBAR, Kardex, Intake/Output, MAR and Recent Results.

## 2019-05-10 NOTE — PROGRESS NOTES
conducted an initial consultation and Spiritual Assessment for [place], who is a 54 y.o.,female. Patients Primary Language is: [place]. According to the patients EMR Yarsanism Affiliation is: Other. The reason the Patient came to the hospital is:  
Patient Active Problem List  
 Diagnosis Date Noted  Appendicitis 05/09/2019  Knee pain 08/23/2014  Macromastia 04/16/2014 The  provided the following Interventions: 
Patient was comfortably lying down in bed as her mom was about to leave when I came to visit. I initiated a relationship of care and support. Explored issues of jaylin, belief, spirituality and Mosque/ritual needs while hospitalized. She has three grown boys. Listened empathically as patient disclosed that she had so much pain in here abdomen, she took the bus to get to the ED and had an emergency surgery due to \" I had ruptured appendix. \" Provided information about Spiritual Care Services. Offered  assurance of continued prayers on patient's behalf. Chart reviewed. The following outcomes where achieved: 
Patient shared limited information about both her medical narrative and spiritual journey/beliefs.  confirmed Patient's Non-Tenriism Yarsanism Affiliation. Patient processed feeling about current hospitalization. Patient expressed gratitude for 's visit. Assessment: 
Patient does not have any Mosque/cultural needs that will affect patients preferences in health care. There are no spiritual or Mosque issues which require intervention at this time. Plan: 
Chaplains will continue to follow and will provide pastoral care on an as needed/requested basis.  recommends bedside caregivers page  on duty if patient shows signs of acute spiritual or emotional distress. Chaplain Resident Clari Summers Spiritual Care  
[phone]

## 2019-05-10 NOTE — ROUTINE PROCESS
Had to encourage /convince and explain pros to patient to get her to take a pain pill. Pain level 4. 
1415 Dsg @ HERNANDO site reinforced. Moderate amt of bloody drainage noted.

## 2019-05-11 LAB
ANION GAP SERPL CALC-SCNC: 6 MMOL/L (ref 3–18)
BASOPHILS # BLD: 0 K/UL (ref 0–0.1)
BASOPHILS NFR BLD: 0 % (ref 0–2)
BUN SERPL-MCNC: 7 MG/DL (ref 7–18)
BUN/CREAT SERPL: 12 (ref 12–20)
CALCIUM SERPL-MCNC: 8.4 MG/DL (ref 8.5–10.1)
CHLORIDE SERPL-SCNC: 110 MMOL/L (ref 100–108)
CO2 SERPL-SCNC: 27 MMOL/L (ref 21–32)
CREAT SERPL-MCNC: 0.58 MG/DL (ref 0.6–1.3)
DIFFERENTIAL METHOD BLD: ABNORMAL
EOSINOPHIL # BLD: 0.1 K/UL (ref 0–0.4)
EOSINOPHIL NFR BLD: 1 % (ref 0–5)
ERYTHROCYTE [DISTWIDTH] IN BLOOD BY AUTOMATED COUNT: 14.6 % (ref 11.6–14.5)
GLUCOSE BLD STRIP.AUTO-MCNC: 100 MG/DL (ref 70–110)
GLUCOSE BLD STRIP.AUTO-MCNC: 80 MG/DL (ref 70–110)
GLUCOSE BLD STRIP.AUTO-MCNC: 84 MG/DL (ref 70–110)
GLUCOSE SERPL-MCNC: 119 MG/DL (ref 74–99)
HCT VFR BLD AUTO: 30.6 % (ref 35–45)
HGB BLD-MCNC: 9.7 G/DL (ref 12–16)
LYMPHOCYTES # BLD: 1.9 K/UL (ref 0.9–3.6)
LYMPHOCYTES NFR BLD: 22 % (ref 21–52)
MCH RBC QN AUTO: 27.1 PG (ref 24–34)
MCHC RBC AUTO-ENTMCNC: 31.7 G/DL (ref 31–37)
MCV RBC AUTO: 85.5 FL (ref 74–97)
MONOCYTES # BLD: 0.5 K/UL (ref 0.05–1.2)
MONOCYTES NFR BLD: 6 % (ref 3–10)
NEUTS SEG # BLD: 6.1 K/UL (ref 1.8–8)
NEUTS SEG NFR BLD: 71 % (ref 40–73)
PLATELET # BLD AUTO: 265 K/UL (ref 135–420)
PMV BLD AUTO: 10.3 FL (ref 9.2–11.8)
POTASSIUM SERPL-SCNC: 3.7 MMOL/L (ref 3.5–5.5)
RBC # BLD AUTO: 3.58 M/UL (ref 4.2–5.3)
SODIUM SERPL-SCNC: 143 MMOL/L (ref 136–145)
WBC # BLD AUTO: 8.6 K/UL (ref 4.6–13.2)

## 2019-05-11 PROCEDURE — 65660000000 HC RM CCU STEPDOWN

## 2019-05-11 PROCEDURE — 82962 GLUCOSE BLOOD TEST: CPT

## 2019-05-11 PROCEDURE — 74011250636 HC RX REV CODE- 250/636

## 2019-05-11 PROCEDURE — 74011250637 HC RX REV CODE- 250/637

## 2019-05-11 PROCEDURE — 85025 COMPLETE CBC W/AUTO DIFF WBC: CPT

## 2019-05-11 PROCEDURE — 80048 BASIC METABOLIC PNL TOTAL CA: CPT

## 2019-05-11 PROCEDURE — 36415 COLL VENOUS BLD VENIPUNCTURE: CPT

## 2019-05-11 RX ADMIN — CIPROFLOXACIN 400 MG: 2 INJECTION, SOLUTION INTRAVENOUS at 11:16

## 2019-05-11 RX ADMIN — CIPROFLOXACIN 400 MG: 2 INJECTION, SOLUTION INTRAVENOUS at 22:01

## 2019-05-11 RX ADMIN — METRONIDAZOLE 500 MG: 500 INJECTION, SOLUTION INTRAVENOUS at 22:01

## 2019-05-11 RX ADMIN — METRONIDAZOLE 500 MG: 500 INJECTION, SOLUTION INTRAVENOUS at 15:28

## 2019-05-11 RX ADMIN — OXYCODONE HYDROCHLORIDE AND ACETAMINOPHEN 1 TABLET: 5; 325 TABLET ORAL at 15:38

## 2019-05-11 RX ADMIN — OXYCODONE HYDROCHLORIDE AND ACETAMINOPHEN 1 TABLET: 5; 325 TABLET ORAL at 22:48

## 2019-05-11 RX ADMIN — METRONIDAZOLE 500 MG: 500 INJECTION, SOLUTION INTRAVENOUS at 08:06

## 2019-05-11 RX ADMIN — OXYCODONE HYDROCHLORIDE AND ACETAMINOPHEN 1 TABLET: 5; 325 TABLET ORAL at 06:52

## 2019-05-11 RX ADMIN — OXYCODONE HYDROCHLORIDE AND ACETAMINOPHEN 1 TABLET: 5; 325 TABLET ORAL at 11:14

## 2019-05-11 NOTE — ROUTINE PROCESS
Bedside and Verbal shift change report given to Mayra (oncoming nurse) by Mamadou Law RN (offgoing nurse). Report included the following information SBAR, Kardex, Intake/Output, MAR and Recent Results.

## 2019-05-11 NOTE — PROGRESS NOTES
POD#2 Pt doing well, no n/v, minimal burping, sarah beth some reg diet, no flatus yet. Ambulating only in room AVSS 
PE - abd - s/nd, BS present, incisions c/d/i, HERNANDO w serosanguinous WBC nl Stable s/p lap appy for perf mehran w abscess Cont reg diet as sarah beth 
OOB in halls Cont IV cipro/flagyl Med input appreciated.

## 2019-05-11 NOTE — PROGRESS NOTES
Problem: Falls - Risk of 
Goal: *Absence of Falls Document Jessie Curtis Fall Risk and appropriate interventions in the flowsheet. Outcome: Progressing Towards Goal 
Fall Risk Interventions: 
Mobility Interventions: Communicate number of staff needed for ambulation/transfer, OT consult for ADLs, Patient to call before getting OOB Medication Interventions: Evaluate medications/consider consulting pharmacy, Patient to call before getting OOB, Teach patient to arise slowly Elimination Interventions:  Call light in reach, Patient to call for help with toileting needs, Toilet paper/wipes in reach, Toileting schedule/hourly rounds, Urinal in reach History of Falls Interventions:Door open when patient unattended

## 2019-05-11 NOTE — ROUTINE PROCESS
Bedside and Verbal shift change report given to [facility], RN (oncoming nurse) by Brigette Knowles RN (offgoing nurse). Report included the following information SBAR, Kardex and MAR.

## 2019-05-11 NOTE — PROGRESS NOTES
[facility] Hospitalist Group Progress Note Patient: Irene Morrissey Age: 54 y.o. : 1970 MR#: [id number] SSN: [id number] Date: 2019 Subjective/24-hour events:  
 
Up in chair at bedside. Tolerating diet without difficulty but does complain of some increased R sided abdominal pain. No BM as of yet. Assessment:  
Perforated appendicitis s/p laparoscopic appendectomy HTN 
DM 2 Plan: 
Continue antibiotic therapy. Diet as as tolerated. Blood sugars well-controlled. SSI as ordered, monitor. Encouraged mobilization. Following. Case discussed with:  [x]Patient  []Family  [x]Nursing  []Case Management DVT Prophylaxis:  []Lovenox  []Hep SQ  [x]SCDs  []Coumadin   []On Heparin gtt Objective:  
VS:  
Visit Vitals /73 (BP 1 Location: Left arm, BP Patient Position: Supine) Pulse 76 Temp 98.2 °F (36.8 °C) Resp 16 Ht 4' 11.02\" (1.499 m) Comment: From 2017 office visti Wt 79.6 kg (175 lb 6.4 oz) SpO2 95% BMI 35.41 kg/m² Tmax/24hrs: Temp (24hrs), Av.2 °F (36.8 °C), Min:98 °F (36.7 °C), Max:98.3 °F (36.8 °C) Intake/Output Summary (Last 24 hours) at 2019 4292 Last data filed at 2019 0486 Gross per 24 hour Intake  Output 1400 ml Net -1400 ml General:  In NAD. Nontoxic-appearing. Cardiovascular:  RRR. Pulmonary:  Clear, no wheezes. Effort nonlabored. GI:  Abdomen soft, NTTP. Extremities:  Warm, no ischemia. Neuro:  Awake and alert, motor grossly nonfocal. 
 
Labs:   
Recent Results (from the past 24 hour(s)) GLUCOSE, POC Collection Time: 05/10/19 11:27 AM  
Result Value Ref Range Glucose (POC) 145 (H) 70 - 110 mg/dL GLUCOSE, POC Collection Time: 05/10/19  3:56 PM  
Result Value Ref Range Glucose (POC) 137 (H) 70 - 110 mg/dL GLUCOSE, POC Collection Time: 05/10/19  9:34 PM  
Result Value Ref Range Glucose (POC) 121 (H) 70 - 110 mg/dL METABOLIC PANEL, BASIC  
 Collection Time: 05/11/19  2:32 AM  
Result Value Ref Range Sodium 143 136 - 145 mmol/L Potassium 3.7 3.5 - 5.5 mmol/L Chloride 110 (H) 100 - 108 mmol/L  
 CO2 27 21 - 32 mmol/L Anion gap 6 3.0 - 18 mmol/L Glucose 119 (H) 74 - 99 mg/dL BUN 7 7.0 - 18 MG/DL Creatinine 0.58 (L) 0.6 - 1.3 MG/DL  
 BUN/Creatinine ratio 12 12 - 20 GFR est AA >60 >60 ml/min/1.73m2 GFR est non-AA >60 >60 ml/min/1.73m2 Calcium 8.4 (L) 8.5 - 10.1 MG/DL  
CBC WITH AUTOMATED DIFF Collection Time: 05/11/19  2:32 AM  
Result Value Ref Range WBC 8.6 4.6 - 13.2 K/uL  
 RBC 3.58 (L) 4.20 - 5.30 M/uL HGB 9.7 (L) 12.0 - 16.0 g/dL HCT 30.6 (L) 35.0 - 45.0 % MCV 85.5 74.0 - 97.0 FL  
 MCH 27.1 24.0 - 34.0 PG  
 MCHC 31.7 31.0 - 37.0 g/dL  
 RDW 14.6 (H) 11.6 - 14.5 % PLATELET 875 264 - 464 K/uL MPV 10.3 9.2 - 11.8 FL  
 NEUTROPHILS 71 40 - 73 % LYMPHOCYTES 22 21 - 52 % MONOCYTES 6 3 - 10 % EOSINOPHILS 1 0 - 5 % BASOPHILS 0 0 - 2 %  
 ABS. NEUTROPHILS 6.1 1.8 - 8.0 K/UL  
 ABS. LYMPHOCYTES 1.9 0.9 - 3.6 K/UL  
 ABS. MONOCYTES 0.5 0.05 - 1.2 K/UL  
 ABS. EOSINOPHILS 0.1 0.0 - 0.4 K/UL  
 ABS. BASOPHILS 0.0 0.0 - 0.1 K/UL  
 DF AUTOMATED    
GLUCOSE, POC Collection Time: 05/11/19  6:42 AM  
Result Value Ref Range Glucose (POC) 84 70 - 110 mg/dL Signed By: Yue Carl MD   
 May 11, 2019

## 2019-05-12 LAB
GLUCOSE BLD STRIP.AUTO-MCNC: 107 MG/DL (ref 70–110)
GLUCOSE BLD STRIP.AUTO-MCNC: 111 MG/DL (ref 70–110)
GLUCOSE BLD STRIP.AUTO-MCNC: 128 MG/DL (ref 70–110)
GLUCOSE BLD STRIP.AUTO-MCNC: 84 MG/DL (ref 70–110)

## 2019-05-12 PROCEDURE — 82962 GLUCOSE BLOOD TEST: CPT

## 2019-05-12 PROCEDURE — 74011250636 HC RX REV CODE- 250/636

## 2019-05-12 PROCEDURE — 74011250637 HC RX REV CODE- 250/637

## 2019-05-12 PROCEDURE — 65660000000 HC RM CCU STEPDOWN

## 2019-05-12 RX ORDER — BUPROPION HYDROCHLORIDE 100 MG/1
100 TABLET ORAL DAILY
Status: DISCONTINUED | OUTPATIENT
Start: 2019-05-13 | End: 2019-05-13 | Stop reason: HOSPADM

## 2019-05-12 RX ORDER — ARIPIPRAZOLE 10 MG/1
20 TABLET ORAL DAILY
Status: DISCONTINUED | OUTPATIENT
Start: 2019-05-13 | End: 2019-05-13 | Stop reason: HOSPADM

## 2019-05-12 RX ADMIN — CIPROFLOXACIN 400 MG: 2 INJECTION, SOLUTION INTRAVENOUS at 22:05

## 2019-05-12 RX ADMIN — CIPROFLOXACIN 400 MG: 2 INJECTION, SOLUTION INTRAVENOUS at 10:13

## 2019-05-12 RX ADMIN — OXYCODONE HYDROCHLORIDE AND ACETAMINOPHEN 1 TABLET: 5; 325 TABLET ORAL at 17:35

## 2019-05-12 RX ADMIN — OXYCODONE HYDROCHLORIDE AND ACETAMINOPHEN 1 TABLET: 5; 325 TABLET ORAL at 10:13

## 2019-05-12 RX ADMIN — OXYCODONE HYDROCHLORIDE AND ACETAMINOPHEN 1 TABLET: 5; 325 TABLET ORAL at 22:12

## 2019-05-12 RX ADMIN — METRONIDAZOLE 500 MG: 500 INJECTION, SOLUTION INTRAVENOUS at 23:28

## 2019-05-12 RX ADMIN — METRONIDAZOLE 500 MG: 500 INJECTION, SOLUTION INTRAVENOUS at 07:00

## 2019-05-12 RX ADMIN — METRONIDAZOLE 500 MG: 500 INJECTION, SOLUTION INTRAVENOUS at 14:46

## 2019-05-12 NOTE — ROUTINE PROCESS
Bedside and Verbal shift change report given to Bettina Caballero (oncoming nurse) by Ballard Goodell RN (offgoing nurse). Report included the following information SBAR, Kardex, Intake/Output, MAR and Recent Results.

## 2019-05-12 NOTE — PROGRESS NOTES
POD#3 Pt doing well, no n/v, no burping, sarah beth reg diet, passing flatus, no BM. Ambulating only in room AVSS 
HERNANDO drain slowly decreasing -- 75cc/24h 
 
PE - abd - s/nd, BS present, incisions c/d/i, HERNANDO w serosanguinous 
  
WBC nl yesterday 
  
Stable s/p lap appy for perf mehran w abscess Cont reg diet as sarah beth Cont HERNANDO -- possibly remove prior to d/c if drainage cont to decrease, and remains clear OOB in halls Cont IV cipro/flagyl -- likely change to PO tomorrow Med input appreciated.

## 2019-05-12 NOTE — PROGRESS NOTES
[facility] Hospitalist Group Progress Note Patient: Shayy Ojeda Age: 54 y.o. : 1970 MR#: [id number] SSN: [id number] Date: 2019 Subjective/24-hour events:  
 
Resting comfortably no new complaints. Continues to tolerate diet with difficulty. Reports passing flatus but no BM as of yet. Assessment:  
Perforated appendicitis s/p laparoscopic appendectomy HTN 
DM 2 Plan: 
Continue cipro/flagyl. Mobilize/ambulate as much as able. Supportive medical care otherwise. Following. Case discussed with:  [x]Patient  []Family  [x]Nursing  []Case Management DVT Prophylaxis:  []Lovenox  []Hep SQ  [x]SCDs  []Coumadin   []On Heparin gtt Objective:  
VS:  
Visit Vitals /87 (BP 1 Location: Right arm, BP Patient Position: At rest) Pulse 81 Temp 98.7 °F (37.1 °C) Resp 17 Ht 4' 11.02\" (1.499 m) Comment: From 2017 office visti Wt 80 kg (176 lb 4.8 oz) SpO2 97% BMI 35.59 kg/m² Tmax/24hrs: Temp (24hrs), Av.5 °F (36.9 °C), Min:98.3 °F (36.8 °C), Max:98.9 °F (37.2 °C) Intake/Output Summary (Last 24 hours) at 2019 0910 Last data filed at 2019 0200 Gross per 24 hour Intake  Output 1075 ml Net -1075 ml General:  In NAD. Nontoxic-appearing. Cardiovascular:  RRR. Pulmonary:  Clear, no wheezes. Effort nonlabored. GI:  Abdomen soft, NTTP. Extremities:  Warm, no ischemia. Neuro:  Awake and alert, motor grossly nonfocal. 
 
Labs:   
Recent Results (from the past 24 hour(s)) GLUCOSE, POC Collection Time: 19 11:20 AM  
Result Value Ref Range Glucose (POC) 80 70 - 110 mg/dL GLUCOSE, POC Collection Time: 19  9:19 PM  
Result Value Ref Range Glucose (POC) 100 70 - 110 mg/dL GLUCOSE, POC Collection Time: 19  8:40 AM  
Result Value Ref Range Glucose (POC) 128 (H) 70 - 110 mg/dL Signed By: Mary Ann Sanchez MD   
 May 12, 2019

## 2019-05-13 VITALS
HEIGHT: 59 IN | SYSTOLIC BLOOD PRESSURE: 95 MMHG | WEIGHT: 173.7 LBS | HEART RATE: 74 BPM | BODY MASS INDEX: 35.02 KG/M2 | RESPIRATION RATE: 17 BRPM | OXYGEN SATURATION: 99 % | TEMPERATURE: 99 F | DIASTOLIC BLOOD PRESSURE: 63 MMHG

## 2019-05-13 LAB
ANION GAP SERPL CALC-SCNC: 6 MMOL/L (ref 3–18)
BUN SERPL-MCNC: 8 MG/DL (ref 7–18)
BUN/CREAT SERPL: 14 (ref 12–20)
CALCIUM SERPL-MCNC: 9 MG/DL (ref 8.5–10.1)
CHLORIDE SERPL-SCNC: 103 MMOL/L (ref 100–108)
CO2 SERPL-SCNC: 29 MMOL/L (ref 21–32)
CREAT SERPL-MCNC: 0.59 MG/DL (ref 0.6–1.3)
GLUCOSE BLD STRIP.AUTO-MCNC: 112 MG/DL (ref 70–110)
GLUCOSE BLD STRIP.AUTO-MCNC: 85 MG/DL (ref 70–110)
GLUCOSE BLD STRIP.AUTO-MCNC: 99 MG/DL (ref 70–110)
GLUCOSE SERPL-MCNC: 118 MG/DL (ref 74–99)
POTASSIUM SERPL-SCNC: 3.8 MMOL/L (ref 3.5–5.5)
SODIUM SERPL-SCNC: 138 MMOL/L (ref 136–145)

## 2019-05-13 PROCEDURE — 80048 BASIC METABOLIC PNL TOTAL CA: CPT

## 2019-05-13 PROCEDURE — 74011250636 HC RX REV CODE- 250/636

## 2019-05-13 PROCEDURE — 82962 GLUCOSE BLOOD TEST: CPT

## 2019-05-13 PROCEDURE — 36415 COLL VENOUS BLD VENIPUNCTURE: CPT

## 2019-05-13 PROCEDURE — 74011250637 HC RX REV CODE- 250/637: Performed by: FAMILY MEDICINE

## 2019-05-13 PROCEDURE — 74011250637 HC RX REV CODE- 250/637

## 2019-05-13 RX ORDER — HYDROCODONE BITARTRATE AND ACETAMINOPHEN 5; 325 MG/1; MG/1
1 TABLET ORAL
Qty: 30 TAB | Refills: 0 | Status: SHIPPED | OUTPATIENT
Start: 2019-05-13 | End: 2019-05-18

## 2019-05-13 RX ORDER — METRONIDAZOLE 500 MG/1
500 TABLET ORAL 3 TIMES DAILY
Qty: 30 TAB | Refills: 0 | Status: SHIPPED | OUTPATIENT
Start: 2019-05-13 | End: 2019-05-22 | Stop reason: ALTCHOICE

## 2019-05-13 RX ORDER — CIPROFLOXACIN 500 MG/1
500 TABLET ORAL 2 TIMES DAILY
Qty: 20 TAB | Refills: 0 | Status: SHIPPED | OUTPATIENT
Start: 2019-05-13

## 2019-05-13 RX ADMIN — ARIPIPRAZOLE 20 MG: 10 TABLET ORAL at 10:20

## 2019-05-13 RX ADMIN — CIPROFLOXACIN 400 MG: 2 INJECTION, SOLUTION INTRAVENOUS at 10:20

## 2019-05-13 RX ADMIN — BUPROPION HYDROCHLORIDE 100 MG: 100 TABLET, FILM COATED ORAL at 10:20

## 2019-05-13 RX ADMIN — METRONIDAZOLE 500 MG: 500 INJECTION, SOLUTION INTRAVENOUS at 06:41

## 2019-05-13 RX ADMIN — OXYCODONE HYDROCHLORIDE AND ACETAMINOPHEN 1 TABLET: 5; 325 TABLET ORAL at 04:24

## 2019-05-13 RX ADMIN — OXYCODONE HYDROCHLORIDE AND ACETAMINOPHEN 1 TABLET: 5; 325 TABLET ORAL at 10:20

## 2019-05-13 NOTE — ROUTINE PROCESS
Bedside and Verbal shift change report given to Feng Griffin RN (oncoming nurse) by Yarelis Ivey RN (offgoing nurse). Report included the following information SBAR, Kardex, Intake/Output, MAR and Cardiac Rhythm sinus rhythm with PACs.

## 2019-05-13 NOTE — DISCHARGE INSTRUCTIONS
Patient armband removed and shredded  MyChart Activation    Thank you for requesting access to [facility]. Please follow the instructions below to securely access and download your online medical record. [facility] allows you to send messages to your doctor, view your test results, renew your prescriptions, schedule appointments, and more. How Do I Sign Up? 1. In your internet browser, go to [email or web address]  2. Click on the First Time User? Click Here link in the Sign In box. You will be redirect to the New Member Sign Up page. 3. Enter your [facility]ient Access Code exactly as it appears below. You will not need to use this code after youve completed the sign-up process. If you do not sign up before the expiration date, you must request a new code. Verient Access Code: [email or web address]  Expires: 2019  4:03 PM (This is the date your Verient access code will )    4. Enter the last four digits of your Social Security Number (xxxx) and Date of Birth (mm/dd/yyyy) as indicated and click Submit. You will be taken to the next sign-up page. 5. Create a Verient ID. This will be your [facility]ient login ID and cannot be changed, so think of one that is secure and easy to remember. 6. Create a Verient password. You can change your password at any time. 7. Enter your Password Reset Question and Answer. This can be used at a later time if you forget your password. 8. Enter your e-mail address. You will receive e-mail notification when new information is available in [address]. 9. Click Sign Up. You can now view and download portions of your medical record. 10. Click the Download Summary menu link to download a portable copy of your medical information. Additional Information    If you have questions, please visit the Frequently Asked Questions section of the Verient website at [email or web address] Remember, [facility] is NOT to be used for urgent needs.  For medical emergencies, dial Jose Thorpe from Nurse    PATIENT INSTRUCTIONS:    After general anesthesia or intravenous sedation, for 24 hours or while taking prescription Narcotics:  · Limit your activities  · Do not drive and operate hazardous machinery  · Do not make important personal or business decisions  · Do  not drink alcoholic beverages  · If you have not urinated within 8 hours after discharge, please contact your surgeon on call. Report the following to your surgeon:  · Excessive pain, swelling, redness or odor of or around the surgical area  · Temperature over 100.5  · Nausea and vomiting lasting longer than 4 hours or if unable to take medications  · Any signs of decreased circulation or nerve impairment to extremity: change in color, persistent  numbness, tingling, coldness or increase pain  · Any questions    What to do at Home:  Recommended activity: Activity as tolerated,     If you experience any of the following symptoms abdominal pain, fever, nausea and vomiting , please follow up with primary care physician or ED  . *  Please give a list of your current medications to your Primary Care Provider. *  Please update this list whenever your medications are discontinued, doses are      changed, or new medications (including over-the-counter products) are added. *  Please carry medication information at all times in case of emergency situations. These are general instructions for a healthy lifestyle:    No smoking/ No tobacco products/ Avoid exposure to second hand smoke  Surgeon General's Warning:  Quitting smoking now greatly reduces serious risk to your health.     Obesity, smoking, and sedentary lifestyle greatly increases your risk for illness    A healthy diet, regular physical exercise & weight monitoring are important for maintaining a healthy lifestyle    You may be retaining fluid if you have a history of heart failure or if you experience any of the following symptoms:  Weight gain of 3 pounds or more overnight or 5 pounds in a week, increased swelling in our hands or feet or shortness of breath while lying flat in bed. Please call your doctor as soon as you notice any of these symptoms; do not wait until your next office visit. Recognize signs and symptoms of STROKE:    F-face looks uneven    A-arms unable to move or move unevenly    S-speech slurred or non-existent    T-time-call 911 as soon as signs and symptoms begin-DO NOT go       Back to bed or wait to see if you get better-TIME IS BRAIN. Warning Signs of HEART ATTACK     Call 911 if you have these symptoms:   Chest discomfort. Most heart attacks involve discomfort in the center of the chest that lasts more than a few minutes, or that goes away and comes back. It can feel like uncomfortable pressure, squeezing, fullness, or pain.  Discomfort in other areas of the upper body. Symptoms can include pain or discomfort in one or both arms, the back, neck, jaw, or stomach.  Shortness of breath with or without chest discomfort.  Other signs may include breaking out in a cold sweat, nausea, or lightheadedness. Don't wait more than five minutes to call 911 - MINUTES MATTER! Fast action can save your life. Calling 911 is almost always the fastest way to get lifesaving treatment. Emergency Medical Services staff can begin treatment when they arrive -- up to an hour sooner than if someone gets to the hospital by car. The discharge information has been reviewed with the patient. The patient verbalized understanding. Discharge medications reviewed with the patient and appropriate educational materials and side effects teaching were provided. ___________________________________________________________________________________________________________________________________    Patient Education        Learning About Appendectomy  What is an appendectomy? An appendectomy is surgery to take out the appendix.  This organ is a small sac that is shaped like a finger. It's attached to your large intestine. Appendicitis happens when the appendix becomes infected and inflamed. An appendectomy is the main treatment for it. If surgery is delayed, the inflamed appendix may burst. A burst appendix can cause serious health problems. If your appendix has burst, you may need an emergency surgery to remove the burst appendix. How is this surgery done? Before surgery, you will get medicine to make you sleep. Appendectomy is usually done as a laparoscopic surgery. That means it is done with only small cuts. These cuts are called incisions. The doctor puts a lighted tube, or scope, and other surgical tools through the cuts in your belly. The doctor is able to see your organs with the scope. The doctor removes the appendix. The cuts heal quickly, and the scars usually fade over time. In some cases, the surgery is done through a single larger cut in the belly. This is called open surgery. What can you expect after surgery? Most people leave the hospital 1 to 3 days after surgery. Some even go home the same day. After you go home, it is normal to feel weak and tired for several days. Your belly may be swollen and may be painful. If you had laparoscopic surgery, you may have shoulder pain for about 24 hours. You may also feel sick to your stomach and have diarrhea, constipation, gas, or a headache. This usually goes away in a few days. Your recovery time depends on the type of surgery you had. If you had laparoscopic surgery, you will probably be able to go back to work or your normal routine 1 to 3 weeks after surgery. If you had an open surgery, it may take 2 to 4 weeks. If your appendix burst, you may have a drain in your incision. Your body will work fine without an appendix. You will not have to make any changes in your diet or daily life. After surgery, be sure to follow your doctor's advice about problems to watch for.  These may include fever, worse belly pain, or problems with your incision. Follow-up care is a key part of your treatment and safety. Be sure to make and go to all appointments, and call your doctor if you are having problems. It's also a good idea to know your test results and keep a list of the medicines you take. Where can you learn more? Go to http://terry-carla.info/. Enter Z914 in the search box to learn more about \"Learning About Appendectomy. \"  Current as of: March 27, 2018  Content Version: 11.9  © 7136-7565 [employer]. Care instructions adapted under license by [employer] (which disclaims liability or warranty for this information). If you have questions about a medical condition or this instruction, always ask your healthcare professional. [address] any warranty or liability for your use of this information.

## 2019-05-13 NOTE — PROGRESS NOTES
Surgery POD 4 s/p lap appy for perf appy and abscess Looks good sarah beth po  
AF VSS Soft min tender abd exam   Drain 55cc WBC 8k Home today on cipro and flagyl

## 2019-05-13 NOTE — DISCHARGE SUMMARY
[address] [place] [address]    Name:  Bernie Leyva  MR#:   [id number]  :  1970  ACCOUNT #:  [de-identified]  ADMIT DATE:  2019  DISCHARGE DATE:    DATE OF DISCHARGE:  2019    DIAGNOSIS:  Perforated appendicitis with phlegmon and abscess. HOSPITAL COURSE:  The patient is a 51-year-old woman who presented on  with signs and symptoms consistent with acute appendicitis. CT scan confirmed this and she was taken to the operating room and underwent an uneventful laparoscopic appendectomy. During this operation, an abscess was found and a phlegmon was dissected. A drain was placed in the abscess cavity during the operation. She tolerated the operation well and went to the floor. By postoperative day 1, she was tolerating ice chips and liquids and her diet advanced over the next few days. Her white count decreased back to normal by postoperative day 3 and she was up walking the halls having bowel movements and tolerating a regular diet. She is being sent home on [place] 5/325 one p.o. every 6 hours p.r.n., Cipro 500 mg p.o. b.i.d., and Flagyl 500 mg p.o. t.i.d. for 10 days and a followup in one week.       Lexii Topete MD [id number]  D:  2019 8:08  T:  2019 9:17  JOB #:  [id number]

## 2019-05-14 ENCOUNTER — HOME HEALTH ADMISSION (OUTPATIENT)
Dept: HOME HEALTH SERVICES | Facility: HOME HEALTH | Age: 49
End: 2019-05-14
Payer: MEDICARE

## 2019-05-14 ENCOUNTER — HOME HEALTH ADMISSION (OUTPATIENT)
Dept: HOME HEALTH SERVICES | Facility: HOME HEALTH | Age: 49
End: 2019-05-14

## 2019-05-14 DIAGNOSIS — K37 APPENDICITIS, UNSPECIFIED APPENDICITIS TYPE: Primary | ICD-10-CM

## 2019-05-15 ENCOUNTER — HOME CARE VISIT (OUTPATIENT)
Dept: SCHEDULING | Facility: HOME HEALTH | Age: 49
End: 2019-05-15
Payer: MEDICARE

## 2019-05-15 LAB
BACTERIA SPEC CULT: NORMAL
BACTERIA SPEC CULT: NORMAL
SERVICE CMNT-IMP: NORMAL
SERVICE CMNT-IMP: NORMAL

## 2019-05-15 PROCEDURE — 3331090002 HH PPS REVENUE DEBIT

## 2019-05-15 PROCEDURE — 400013 HH SOC

## 2019-05-15 PROCEDURE — G0299 HHS/HOSPICE OF RN EA 15 MIN: HCPCS

## 2019-05-15 PROCEDURE — 3331090001 HH PPS REVENUE CREDIT

## 2019-05-16 ENCOUNTER — HOME CARE VISIT (OUTPATIENT)
Dept: SCHEDULING | Facility: HOME HEALTH | Age: 49
End: 2019-05-16
Payer: MEDICARE

## 2019-05-16 VITALS
DIASTOLIC BLOOD PRESSURE: 83 MMHG | OXYGEN SATURATION: 95 % | HEART RATE: 80 BPM | RESPIRATION RATE: 16 BRPM | TEMPERATURE: 98.3 F | SYSTOLIC BLOOD PRESSURE: 109 MMHG

## 2019-05-16 PROCEDURE — A4452 WATERPROOF TAPE: HCPCS

## 2019-05-16 PROCEDURE — 3331090002 HH PPS REVENUE DEBIT

## 2019-05-16 PROCEDURE — G0299 HHS/HOSPICE OF RN EA 15 MIN: HCPCS

## 2019-05-16 PROCEDURE — 3331090001 HH PPS REVENUE CREDIT

## 2019-05-16 PROCEDURE — A6402 STERILE GAUZE <= 16 SQ IN: HCPCS

## 2019-05-17 ENCOUNTER — HOME CARE VISIT (OUTPATIENT)
Dept: HOME HEALTH SERVICES | Facility: HOME HEALTH | Age: 49
End: 2019-05-17
Payer: MEDICARE

## 2019-05-17 PROCEDURE — 3331090002 HH PPS REVENUE DEBIT

## 2019-05-17 PROCEDURE — 3331090001 HH PPS REVENUE CREDIT

## 2019-05-18 PROCEDURE — 3331090002 HH PPS REVENUE DEBIT

## 2019-05-18 PROCEDURE — 3331090001 HH PPS REVENUE CREDIT

## 2019-05-19 ENCOUNTER — HOME CARE VISIT (OUTPATIENT)
Dept: SCHEDULING | Facility: HOME HEALTH | Age: 49
End: 2019-05-19
Payer: MEDICARE

## 2019-05-19 PROCEDURE — G0299 HHS/HOSPICE OF RN EA 15 MIN: HCPCS

## 2019-05-19 PROCEDURE — 3331090001 HH PPS REVENUE CREDIT

## 2019-05-19 PROCEDURE — 3331090002 HH PPS REVENUE DEBIT

## 2019-05-20 PROCEDURE — 3331090001 HH PPS REVENUE CREDIT

## 2019-05-20 PROCEDURE — 3331090002 HH PPS REVENUE DEBIT

## 2019-05-21 ENCOUNTER — HOME CARE VISIT (OUTPATIENT)
Dept: SCHEDULING | Facility: HOME HEALTH | Age: 49
End: 2019-05-21
Payer: MEDICARE

## 2019-05-21 VITALS
TEMPERATURE: 98.7 F | DIASTOLIC BLOOD PRESSURE: 70 MMHG | RESPIRATION RATE: 16 BRPM | HEART RATE: 68 BPM | OXYGEN SATURATION: 99 % | SYSTOLIC BLOOD PRESSURE: 118 MMHG

## 2019-05-21 PROCEDURE — 3331090001 HH PPS REVENUE CREDIT

## 2019-05-21 PROCEDURE — G0299 HHS/HOSPICE OF RN EA 15 MIN: HCPCS

## 2019-05-21 PROCEDURE — 3331090002 HH PPS REVENUE DEBIT

## 2019-05-22 ENCOUNTER — OFFICE VISIT (OUTPATIENT)
Dept: SURGERY | Age: 49
End: 2019-05-22

## 2019-05-22 VITALS
HEART RATE: 86 BPM | DIASTOLIC BLOOD PRESSURE: 74 MMHG | BODY MASS INDEX: 34.07 KG/M2 | HEIGHT: 59 IN | WEIGHT: 169 LBS | OXYGEN SATURATION: 100 % | RESPIRATION RATE: 18 BRPM | TEMPERATURE: 96.9 F | SYSTOLIC BLOOD PRESSURE: 125 MMHG

## 2019-05-22 DIAGNOSIS — Z87.19 HISTORY OF APPENDICITIS: Primary | ICD-10-CM

## 2019-05-22 PROCEDURE — 3331090002 HH PPS REVENUE DEBIT

## 2019-05-22 PROCEDURE — 3331090001 HH PPS REVENUE CREDIT

## 2019-05-22 RX ORDER — ONDANSETRON 4 MG/1
TABLET, ORALLY DISINTEGRATING ORAL
Refills: 1 | COMMUNITY
Start: 2019-05-12

## 2019-05-22 NOTE — PROGRESS NOTES
Chief Complaint   Patient presents with    Post OP Follow Up     appendectomy on 5/19/2019. patient has drains. Pt states amount in drains has decreased. States she emptied 10 today.

## 2019-05-22 NOTE — PROGRESS NOTES
Progress Note    Patient: Alverto Oconnor  MRN: [id number]  SSN: [id number]   YOB: 1970  Age: 54 y.o. Sex: female     Chief Complaint   Patient presents with    Post OP Follow Up     appendectomy on 2019. patient has drains. HPI    Ms. Pang is a 44-year-old woman who last week I did a laparoscopic appendectomy for perforated appendix. She had a drain and several days in the hospital on antibiotics but has done quite well. She looks great today and I removed her drain in the office. Her wounds look good and she is tolerating regular diet moving her bowels without difficulty. Both her other wounds look good and we will see her in the office PRN. Past Medical History:   Diagnosis Date    Diabetes ([facility]     Hypertension     Morbid obesity ([facility] [facility]     Psychiatric disorder     phychotic    Rapid heart rate      Past Surgical History:   Procedure Laterality Date    ABDOMEN SURGERY PROC UNLISTED      HX BREAST REDUCTION  2014    BREAST REDUCTION bilateral with inferior pedicles performed by Rag[facility]aca [facility] MAIN OR    HX  SECTION       No Known Allergies  Current Outpatient Medications   Medication Sig Dispense Refill    ondansetron (ZOFRAN ODT) 4 mg disintegrating tablet 1 (ONE) TABLET DISPERSE, ORAL, TWO TIMES DAILY, AS NEEDED  1    ciprofloxacin HCl (CIPRO) 500 mg tablet Take 1 Tab by mouth two (2) times a day. Indications: Complicated Peritonitis caused by E. Coli 20 Tab 0    buPROPion XL (WELLBUTRIN XL) 150 mg tablet Take 150 mg by mouth every morning.  oxyCODONE-acetaminophen (PERCOCET) 5-325 mg per tablet Take 1 tablet every 4-6 hours as needed for pain control. If you were instructed to try over the counter ibuprofen or tylenol, only take the percocet for pain not controlled with the over the counter medication. 12 Tab 0    ARIPIPRAZOLE (ABILIFY PO) Take 20 mg by mouth daily.       polyethylene glycol (MIRALAX) 17 gram/dose powder Take 17 g by mouth daily. 1 tablespoon with 8 oz of water daily 17 g 0    METOPROLOL TARTRATE PO Take  by mouth daily.  HYDROCHLOROTHIAZIDE PO Take  by mouth daily.  METFORMIN HCL (METFORMIN PO) Take  by mouth daily.        Social History     Socioeconomic History    Marital status: SINGLE     Spouse name: Not on file    Number of children: Not on file    Years of education: Not on file    Highest education level: Not on file   Occupational History    Not on file   Social Needs    Financial resource strain: Not on file    Food insecurity:     Worry: Not on file     Inability: Not on file    Transportation needs:     Medical: Not on file     Non-medical: Not on file   Tobacco Use    Smoking status: Never Smoker   Substance and Sexual Activity    Alcohol use: Yes     Comment: occassionally    Drug use: No    Sexual activity: Yes     Partners: Male   Lifestyle    Physical activity:     Days per week: Not on file     Minutes per session: Not on file    Stress: Not on file   Relationships    Social connections:     Talks on phone: Not on file     Gets together: Not on file     Attends [employer] service: Not on file     Active member of club or organization: Not on file     Attends meetings of clubs or organizations: Not on file     Relationship status: Not on file    Intimate partner violence:     Fear of current or ex partner: Not on file     Emotionally abused: Not on file     Physically abused: Not on file     Forced sexual activity: Not on file   Other Topics Concern    Not on file   Social History Narrative    Not on file     Family History   Problem Relation Age of Onset    Cancer Maternal Aunt         colon/breast    Diabetes Maternal Aunt          Review of systems:  Patient denies any reflux, emesis, abdominal pain, change in bowel habits, hematochezia, melena, fever, weight loss, fatigue chills, dermatitis, abnormal moles, change in vision, vertigo, epistaxis, dysphagia, hoarseness, chest pain, palpitations, hypertension, edema, cough, shortness of breath, wheezing, hemoptysis, snoring, hematuria, diabetes, thyroid disease, anemia, bruising, history of blood transfusion, dizziness, headache, or fainting. Physical Examination    Well developed well nourished female in no apparent distress  Visit Vitals  /74   Pulse 86   Temp 96.9 °F (36.1 °C) (Oral)   Resp 18   Ht 4' 11\" (1.499 m)   Wt 76.7 kg (169 lb)   SpO2 100%   BMI 34.13 kg/m²      Head: normocephalic, atraumatic  Mouth: Clear, no overt lesions, oral mucosa pink and moist  Neck: supple, no masses, no adenopathy or carotid bruits, trachea midline  Resp: clear to auscultation bilaterally, no wheeze, rhonchi or rales, excursions normal and symmetrical  Cardio: Regular rate and rhythm, no murmurs, clicks, gallops or rubs, no edema or varicosities  Abdomen: soft, nontender, nondistended, normoactive bowel sounds, no hernias, no hepatosplenomegaly, healing laparoscopic wounds  Back: Deferred  Extremeties: warm, well-perfused, no tenderness or swelling, normal gait/station  Neuro: sensation and strength grossly intact and symmetrical  Psych: alert and oriented to person, place and time  Breast exam deferred    IMPRESSION  Status post laparoscopic appendectomy for perforated appendix.   Doing well    PLAN  Orders Placed This Encounter    ondansetron (ZOFRAN ODT) 4 mg disintegrating tablet     Si (ONE) TABLET DISPERSE, ORAL, TWO TIMES DAILY, AS NEEDED     Refill:  1     Follow-up PRN  Michelle Morgan MD

## 2019-05-23 ENCOUNTER — HOME CARE VISIT (OUTPATIENT)
Dept: HOME HEALTH SERVICES | Facility: HOME HEALTH | Age: 49
End: 2019-05-23
Payer: MEDICARE

## 2019-05-23 VITALS
HEART RATE: 68 BPM | RESPIRATION RATE: 16 BRPM | OXYGEN SATURATION: 99 % | TEMPERATURE: 98.7 F | DIASTOLIC BLOOD PRESSURE: 70 MMHG | SYSTOLIC BLOOD PRESSURE: 118 MMHG

## 2019-05-23 PROCEDURE — 3331090001 HH PPS REVENUE CREDIT

## 2019-05-23 PROCEDURE — 3331090003 HH PPS REVENUE ADJ

## 2019-05-23 PROCEDURE — 3331090002 HH PPS REVENUE DEBIT

## 2019-05-23 NOTE — OP NOTES
[address] [place] [address]   OPERATIVE REPORT    Name:  Cheyenne Smith  MR#:   [id number]  :  1970  ACCOUNT #:  [de-identified]  DATE OF SERVICE:  2019      PREOPERATIVE DIAGNOSIS:  Appendicitis. POSTOPERATIVE DIAGNOSIS:  Perforated appendicitis. PROCEDURE PERFORMED:  Exploratory Laparotomy and Appendectomy    SURGEON:  Selin Reeves MD    ASSISTANT:      ANESTHESIA:  General.    COMPLICATIONS:  None. SPECIMENS REMOVED:  Appendix. IMPLANTS:  HERNANDO drain. ESTIMATED BLOOD LOSS:  Minimal.    FINDINGS:  The patient is a 82-year-old woman with what appears to be appendicitis by CT and symptoms. She was brought to the operative room for laparoscopic appendectomy. PROCEDURE:  After appropriate antibiotics and sequential compression stockings, the patient was taken to the operating room, placed in a supine position on the OR table. After adequate general anesthesia, her abdomen was prepped and draped to [facility] standard. An infraumbilical incision was created and a blunt dissection of fascia performed. The fascia was stay stitched with 0 Vicryl and opened sharply with Metzenbaum scissors. A Carina port was placed and pneumoperitoneum induced. A generalized exploration revealed adhesions; however, they were easily maneuvered around and a what appeared to be rigid appendix. A second 12-mm port was placed in the left lower quadrant after some adhesiolysis with EndoShears. A 5-mm port was placed in the right upper quadrant. Under direct vision, the appendix was mobilized using blunt technique. It was noted that the appendix was indeed perforated and a small amount of periappendiceal purulence was noted. This was aspirated with a suction device. Using an Endo JOSE the appendix was taken from the mesoappendix and ultimately transected and removed from the abdomen using an EndoCatch through the left lower quadrant port site.   Copious irrigation was carried out in the right lower quadrant as well as the right upper quadrant and a #10 flat HERNANDO drain was placed into the pelvis via the left lower quadrant port site and withdrawn from the right upper quadrant port site. The drain was fastened with 2-0 nylon. Hemostasis appeared good and the two other large port sites were removed. They were both closed with 0 Vicryl suture and 4-0 Monocryl for the skin. Steri-Strips and sterile dressings were applied. She tolerated the procedure well and was taken to recovery in stable condition.       Aicha Funes MD [id number]  D:  05/22/2019 16:23  T:  05/23/2019 0:39  JOB #:  [id number]

## 2019-05-24 PROCEDURE — 3331090002 HH PPS REVENUE DEBIT

## 2019-05-24 PROCEDURE — 3331090001 HH PPS REVENUE CREDIT

## 2019-05-25 PROCEDURE — 3331090001 HH PPS REVENUE CREDIT

## 2019-05-25 PROCEDURE — 3331090002 HH PPS REVENUE DEBIT

## 2019-05-26 PROCEDURE — 3331090002 HH PPS REVENUE DEBIT

## 2019-05-26 PROCEDURE — 3331090001 HH PPS REVENUE CREDIT

## 2019-05-27 PROCEDURE — 3331090002 HH PPS REVENUE DEBIT

## 2019-05-27 PROCEDURE — 3331090001 HH PPS REVENUE CREDIT

## 2019-05-28 VITALS
DIASTOLIC BLOOD PRESSURE: 80 MMHG | OXYGEN SATURATION: 98 % | SYSTOLIC BLOOD PRESSURE: 120 MMHG | HEART RATE: 84 BPM | TEMPERATURE: 98.2 F | RESPIRATION RATE: 20 BRPM

## 2019-05-28 PROCEDURE — 3331090001 HH PPS REVENUE CREDIT

## 2019-05-28 PROCEDURE — 3331090002 HH PPS REVENUE DEBIT

## 2019-05-29 PROCEDURE — 3331090002 HH PPS REVENUE DEBIT

## 2019-05-29 PROCEDURE — 3331090001 HH PPS REVENUE CREDIT

## 2019-05-30 PROCEDURE — 3331090001 HH PPS REVENUE CREDIT

## 2019-05-30 PROCEDURE — 3331090002 HH PPS REVENUE DEBIT

## 2019-05-31 PROCEDURE — 3331090002 HH PPS REVENUE DEBIT

## 2019-05-31 PROCEDURE — 3331090001 HH PPS REVENUE CREDIT

## 2019-06-01 PROCEDURE — 3331090002 HH PPS REVENUE DEBIT

## 2019-06-01 PROCEDURE — 3331090001 HH PPS REVENUE CREDIT

## 2019-06-02 PROCEDURE — 3331090001 HH PPS REVENUE CREDIT

## 2019-06-02 PROCEDURE — 3331090002 HH PPS REVENUE DEBIT

## 2019-06-03 PROCEDURE — 3331090001 HH PPS REVENUE CREDIT

## 2019-06-03 PROCEDURE — 3331090002 HH PPS REVENUE DEBIT

## 2019-06-04 PROCEDURE — 3331090001 HH PPS REVENUE CREDIT

## 2019-06-04 PROCEDURE — 3331090002 HH PPS REVENUE DEBIT

## 2019-06-05 PROCEDURE — 3331090001 HH PPS REVENUE CREDIT

## 2019-06-05 PROCEDURE — 3331090002 HH PPS REVENUE DEBIT

## 2019-06-06 PROCEDURE — 3331090001 HH PPS REVENUE CREDIT

## 2019-06-06 PROCEDURE — 3331090002 HH PPS REVENUE DEBIT

## 2019-06-07 PROCEDURE — 3331090002 HH PPS REVENUE DEBIT

## 2019-06-07 PROCEDURE — 3331090001 HH PPS REVENUE CREDIT

## 2019-06-08 PROCEDURE — 3331090002 HH PPS REVENUE DEBIT

## 2019-06-08 PROCEDURE — 3331090001 HH PPS REVENUE CREDIT

## 2019-06-09 PROCEDURE — 3331090002 HH PPS REVENUE DEBIT

## 2019-06-09 PROCEDURE — 3331090001 HH PPS REVENUE CREDIT

## 2019-12-27 ENCOUNTER — HOSPITAL ENCOUNTER (EMERGENCY)
Age: 49
Discharge: HOME | End: 2019-12-27
Attending: EMERGENCY MEDICINE
Payer: MEDICARE

## 2019-12-27 VITALS
RESPIRATION RATE: 14 BRPM | OXYGEN SATURATION: 100 % | DIASTOLIC BLOOD PRESSURE: 105 MMHG | HEIGHT: 59 IN | SYSTOLIC BLOOD PRESSURE: 159 MMHG | BODY MASS INDEX: 34.27 KG/M2 | WEIGHT: 170 LBS | HEART RATE: 100 BPM | TEMPERATURE: 97.8 F

## 2019-12-27 DIAGNOSIS — R42 LIGHT HEADED: Primary | ICD-10-CM

## 2019-12-27 LAB
ANION GAP SERPL CALC-SCNC: 3 MMOL/L (ref 3–18)
ATRIAL RATE: 84 BPM
BASOPHILS # BLD: 0 K/UL (ref 0–0.1)
BASOPHILS NFR BLD: 0 % (ref 0–2)
BUN SERPL-MCNC: 13 MG/DL (ref 7–18)
BUN/CREAT SERPL: 22 (ref 12–20)
CALCIUM SERPL-MCNC: 9.5 MG/DL (ref 8.5–10.1)
CALCULATED P AXIS, ECG09: 36 DEGREES
CALCULATED R AXIS, ECG10: 12 DEGREES
CALCULATED T AXIS, ECG11: 36 DEGREES
CHLORIDE SERPL-SCNC: 106 MMOL/L (ref 100–111)
CO2 SERPL-SCNC: 30 MMOL/L (ref 21–32)
CREAT SERPL-MCNC: 0.59 MG/DL (ref 0.6–1.3)
DIAGNOSIS, 93000: NORMAL
DIFFERENTIAL METHOD BLD: ABNORMAL
EOSINOPHIL # BLD: 0.2 K/UL (ref 0–0.4)
EOSINOPHIL NFR BLD: 2 % (ref 0–5)
ERYTHROCYTE [DISTWIDTH] IN BLOOD BY AUTOMATED COUNT: 15.8 % (ref 11.6–14.5)
GLUCOSE BLD STRIP.AUTO-MCNC: 76 MG/DL (ref 70–110)
GLUCOSE SERPL-MCNC: 91 MG/DL (ref 74–99)
HCT VFR BLD AUTO: 37.6 % (ref 35–45)
HGB BLD-MCNC: 11.9 G/DL (ref 12–16)
LYMPHOCYTES # BLD: 3.6 K/UL (ref 0.9–3.6)
LYMPHOCYTES NFR BLD: 41 % (ref 21–52)
MCH RBC QN AUTO: 25.4 PG (ref 24–34)
MCHC RBC AUTO-ENTMCNC: 31.6 G/DL (ref 31–37)
MCV RBC AUTO: 80.2 FL (ref 74–97)
MONOCYTES # BLD: 0.5 K/UL (ref 0.05–1.2)
MONOCYTES NFR BLD: 5 % (ref 3–10)
NEUTS SEG # BLD: 4.6 K/UL (ref 1.8–8)
NEUTS SEG NFR BLD: 52 % (ref 40–73)
P-R INTERVAL, ECG05: 152 MS
PLATELET # BLD AUTO: 385 K/UL (ref 135–420)
PMV BLD AUTO: 10.2 FL (ref 9.2–11.8)
POTASSIUM SERPL-SCNC: 3.8 MMOL/L (ref 3.5–5.5)
Q-T INTERVAL, ECG07: 364 MS
QRS DURATION, ECG06: 78 MS
QTC CALCULATION (BEZET), ECG08: 430 MS
RBC # BLD AUTO: 4.69 M/UL (ref 4.2–5.3)
SODIUM SERPL-SCNC: 139 MMOL/L (ref 136–145)
VENTRICULAR RATE, ECG03: 84 BPM
WBC # BLD AUTO: 8.8 K/UL (ref 4.6–13.2)

## 2019-12-27 PROCEDURE — 93005 ELECTROCARDIOGRAM TRACING: CPT

## 2019-12-27 PROCEDURE — 80048 BASIC METABOLIC PNL TOTAL CA: CPT

## 2019-12-27 PROCEDURE — 99283 EMERGENCY DEPT VISIT LOW MDM: CPT

## 2019-12-27 PROCEDURE — 85025 COMPLETE CBC W/AUTO DIFF WBC: CPT

## 2019-12-27 PROCEDURE — 82962 GLUCOSE BLOOD TEST: CPT

## 2019-12-27 NOTE — ED TRIAGE NOTES
\"I felt lightheaded this morning at about 9:30 and there's pressure behind my eye lids. \"  Denies unilateral weakness or changes in vision or speech.

## 2019-12-27 NOTE — ED PROVIDER NOTES
EMERGENCY DEPARTMENT HISTORY AND PHYSICAL EXAM    3:00 PM      Date: 12/27/2019  Patient Name: Lazaro Morataya    History of Presenting Illness     Chief Complaint   Patient presents with    Dizziness         History Provided By: Patient  Location/Duration/Severity/Modifying factors   55 y/o F with lightheadedness since getting off the bus today. Describes as feeling as if she is about to fall over. Has mild pressure in forehead but denies headache. No N/V, difficulty walking, vision changes, fever, chest pain, SOB. No longer takes meds for htn or diabetes since gastric sleeve 3 years ago. Nonsmoker. PCP: Dyke Dubin, NP    Current Outpatient Medications   Medication Sig Dispense Refill    ondansetron (ZOFRAN ODT) 4 mg disintegrating tablet 1 (ONE) TABLET DISPERSE, ORAL, TWO TIMES DAILY, AS NEEDED  1    ciprofloxacin HCl (CIPRO) 500 mg tablet Take 1 Tab by mouth two (2) times a day. Indications: Complicated Peritonitis caused by E. Coli 20 Tab 0    buPROPion XL (WELLBUTRIN XL) 150 mg tablet Take 150 mg by mouth every morning.  polyethylene glycol (MIRALAX) 17 gram/dose powder Take 17 g by mouth daily. 1 tablespoon with 8 oz of water daily 17 g 0    oxyCODONE-acetaminophen (PERCOCET) 5-325 mg per tablet Take 1 tablet every 4-6 hours as needed for pain control. If you were instructed to try over the counter ibuprofen or tylenol, only take the percocet for pain not controlled with the over the counter medication. 12 Tab 0    METOPROLOL TARTRATE PO Take  by mouth daily.  HYDROCHLOROTHIAZIDE PO Take  by mouth daily.  ARIPIPRAZOLE (ABILIFY PO) Take 20 mg by mouth daily.  METFORMIN HCL (METFORMIN PO) Take  by mouth daily.          Past History     Past Medical History:  Past Medical History:   Diagnosis Date    Diabetes ([address]     Hypertension     Morbid obesity ([facility] [address]     Psychiatric disorder     phychotic    Rapid heart rate        Past Surgical History:  Past Surgical History:   Procedure Laterality Date    ABDOMEN SURGERY PROC UNLISTED      HX BREAST REDUCTION  2014    BREAST REDUCTION bilateral with inferior pedicles performed by Eriberto [facility] MA[facility] OR    HX  SECTION      LAP,APPENDECTOMY N/A 2019    Dr. Maria Fernanda Otero       Family History:  Family History   Problem Relation Age of Onset    Cancer Maternal Aunt         colon/breast    Diabetes Maternal Aunt        Social History:  Social History     Tobacco Use    Smoking status: Never Smoker    Smokeless tobacco: Never Used   Substance Use Topics    Alcohol use: Yes     Comment: occassionally    Drug use: No       Allergies:  No Known Allergies      Review of Systems     Review of Systems   Constitutional: Negative for activity change, appetite change, chills and fever. HENT: Negative for congestion, rhinorrhea, sinus pressure, sinus pain, trouble swallowing and voice change. Eyes: Negative for photophobia and visual disturbance. Respiratory: Negative for cough and shortness of breath. Cardiovascular: Negative for chest pain, palpitations and leg swelling. Gastrointestinal: Negative for abdominal pain, diarrhea, nausea and vomiting. Endocrine: Negative for polydipsia and polyuria. Genitourinary: Negative for dysuria. Musculoskeletal: Negative for arthralgias, back pain, myalgias, neck pain and neck stiffness. Skin: Negative for rash. Neurological: Positive for light-headedness. Negative for syncope, facial asymmetry, weakness, numbness and headaches. Hematological: Does not bruise/bleed easily. Physical Exam     Visit Vitals  BP (!) 159/105 (BP 1 Location: Left arm, BP Patient Position: At rest)   Pulse 100   Temp 97.8 °F (36.6 °C)   Resp 14   Ht 4' 11\" (1.499 m)   Wt 77.1 kg (170 lb)   LMP 2019 (Approximate)   SpO2 100%   BMI 34.34 kg/m²       Physical Exam  Vitals signs and nursing note reviewed.    Constitutional:       General: She is not in acute distress. Appearance: Normal appearance. She is not ill-appearing. HENT:      Head: Normocephalic and atraumatic. Right Ear: External ear normal.      Left Ear: External ear normal.      Nose: Nose normal. No congestion or rhinorrhea. Mouth/Throat:      Mouth: Mucous membranes are moist.      Pharynx: Oropharynx is clear. Eyes:      Extraocular Movements: Extraocular movements intact. Conjunctiva/sclera: Conjunctivae normal.      Pupils: Pupils are equal, round, and reactive to light. Neck:      Musculoskeletal: Normal range of motion and neck supple. Cardiovascular:      Rate and Rhythm: Normal rate and regular rhythm. Pulses: Normal pulses. Heart sounds: No murmur. Pulmonary:      Effort: Pulmonary effort is normal.      Breath sounds: Normal breath sounds. Abdominal:      General: There is no distension. Palpations: Abdomen is soft. Tenderness: There is no tenderness. Musculoskeletal: Normal range of motion. General: No swelling or tenderness. Skin:     General: Skin is warm and dry. Capillary Refill: Capillary refill takes less than 2 seconds. Neurological:      General: No focal deficit present. Mental Status: She is alert and oriented to person, place, and time. Cranial Nerves: No cranial nerve deficit. Sensory: No sensory deficit. Motor: No weakness.       Coordination: Coordination normal.      Comments: R going nystagmus   Psychiatric:         Mood and Affect: Mood normal.         Behavior: Behavior normal.           Diagnostic Study Results     Labs -  Recent Results (from the past 12 hour(s))   GLUCOSE, POC    Collection Time: 12/27/19  1:27 PM   Result Value Ref Range    Glucose (POC) 76 70 - 692 mg/dL   METABOLIC PANEL, BASIC    Collection Time: 12/27/19  1:40 PM   Result Value Ref Range    Sodium 139 136 - 145 mmol/L    Potassium 3.8 3.5 - 5.5 mmol/L    Chloride 106 100 - 111 mmol/L    CO2 30 21 - 32 mmol/L Anion gap 3 3.0 - 18 mmol/L    Glucose 91 74 - 99 mg/dL    BUN 13 7.0 - 18 MG/DL    Creatinine 0.59 (L) 0.6 - 1.3 MG/DL    BUN/Creatinine ratio 22 (H) 12 - 20      GFR est AA >60 >60 ml/min/1.73m2    GFR est non-AA >60 >60 ml/min/1.73m2    Calcium 9.5 8.5 - 10.1 MG/DL   CBC WITH AUTOMATED DIFF    Collection Time: 12/27/19  1:40 PM   Result Value Ref Range    WBC 8.8 4.6 - 13.2 K/uL    RBC 4.69 4.20 - 5.30 M/uL    HGB 11.9 (L) 12.0 - 16.0 g/dL    HCT 37.6 35.0 - 45.0 %    MCV 80.2 74.0 - 97.0 FL    MCH 25.4 24.0 - 34.0 PG    MCHC 31.6 31.0 - 37.0 g/dL    RDW 15.8 (H) 11.6 - 14.5 %    PLATELET 433 999 - 815 K/uL    MPV 10.2 9.2 - 11.8 FL    NEUTROPHILS 52 40 - 73 %    LYMPHOCYTES 41 21 - 52 %    MONOCYTES 5 3 - 10 %    EOSINOPHILS 2 0 - 5 %    BASOPHILS 0 0 - 2 %    ABS. NEUTROPHILS 4.6 1.8 - 8.0 K/UL    ABS. LYMPHOCYTES 3.6 0.9 - 3.6 K/UL    ABS. MONOCYTES 0.5 0.05 - 1.2 K/UL    ABS. EOSINOPHILS 0.2 0.0 - 0.4 K/UL    ABS. BASOPHILS 0.0 0.0 - 0.1 K/UL    DF AUTOMATED     EKG, 12 LEAD, INITIAL    Collection Time: 12/27/19  1:43 PM   Result Value Ref Range    Ventricular Rate 84 BPM    Atrial Rate 84 BPM    P-R Interval 152 ms    QRS Duration 78 ms    Q-T Interval 364 ms    QTC Calculation (Bezet) 430 ms    Calculated P Axis 36 degrees    Calculated R Axis 12 degrees    Calculated T Axis 36 degrees    Diagnosis       Normal sinus rhythm  Normal ECG  When compared with ECG of 09-MAY-2019 18:15,  No significant change was found  Confirmed by Barrington Tena MD, Caron Jaquez ([id number]) on 12/27/2019 2:59:09 PM         Radiologic Studies -   No orders to display         Medical Decision Making   I am the first provider for this patient. I reviewed the vital signs, available nursing notes, past medical history, past surgical history, family history and social history. Vital Signs-Reviewed the patient's vital signs.       EKG: NSR, normal intervals, no ST or T wave changes consistent with ischemia, no evidence of WPW, HOCM, Brugada, ARVD.    Records Reviewed: Nursing Notes and Old Medical Records (Time of Review: 3:00 PM)    ED Course: Progress Notes, Reevaluation, and Consults:         Provider Notes (Medical Decision Making):   MDM  53 y/o F with lightheadedness. Patient well-appearing, afebrile, hemodynamically stable. No respiratory distress. Normal neuro exam and reassuring HINTS exam. Normal gait. EKG unremarkable, as above. Anemia stable and chronic, no electrolyte abnormalities. Patient feels better on re-evaluation without intervention. Will discharge to f/u with PCP. Patient given return precautions and understands and agrees with plan. Diagnosis     Clinical Impression:   1. Light headed        Disposition: Discharged    Follow-up Information     Follow up With Specialties Details Why Contact Info    Susannah Espinosa NP Nurse Practitioner  As needed if symptoms continue [address] [place] [address]   [address]  [address] [facility] EMERGENCY DEPT Emergency Medicine  If symptoms worsen or you develop fever or other medical concerns [address] [place] [id number]  [phone]           Patient's Medications   Start Taking    No medications on file   Continue Taking    ARIPIPRAZOLE (ABILIFY PO)    Take 20 mg by mouth daily. BUPROPION XL (WELLBUTRIN XL) 150 MG TABLET    Take 150 mg by mouth every morning. CIPROFLOXACIN HCL (CIPRO) 500 MG TABLET    Take 1 Tab by mouth two (2) times a day. Indications: Complicated Peritonitis caused by E. Coli    HYDROCHLOROTHIAZIDE PO    Take  by mouth daily. METFORMIN HCL (METFORMIN PO)    Take  by mouth daily. METOPROLOL TARTRATE PO    Take  by mouth daily. ONDANSETRON (ZOFRAN ODT) 4 MG DISINTEGRATING TABLET    1 (ONE) TABLET DISPERSE, ORAL, TWO TIMES DAILY, AS NEEDED    OXYCODONE-ACETAMINOPHEN (PERCOCET) 5-325 MG PER TABLET    Take 1 tablet every 4-6 hours as needed for pain control.   If you were instructed to try over the counter ibuprofen or tylenol, only take the percocet for pain not controlled with the over the counter medication. POLYETHYLENE GLYCOL (MIRALAX) 17 GRAM/DOSE POWDER    Take 17 g by mouth daily. 1 tablespoon with 8 oz of water daily   These Medications have changed    No medications on file   Stop Taking    No medications on file     Disclaimer: Sections of this note are dictated using utilizing voice recognition software. Minor typographical errors may be present. If questions arise, please do not hesitate to contact me or call our department.

## 2019-12-27 NOTE — DISCHARGE INSTRUCTIONS
Patient Education        Lightheadedness or Faintness: Care Instructions  Your Care Instructions  Lightheadedness is a feeling that you are about to faint or \"pass out. \" You do not feel as if you or your surroundings are moving. It is different from vertigo, which is the feeling that you or things around you are spinning or tilting. Lightheadedness usually goes away or gets better when you lie down. If lightheadedness gets worse, it can lead to a fainting spell. It is common to feel lightheaded from time to time. Lightheadedness usually is not caused by a serious problem. It often is caused by a short-lasting drop in blood pressure and blood flow to your head that occurs when you get up too quickly from a seated or lying position. Follow-up care is a key part of your treatment and safety. Be sure to make and go to all appointments, and call your doctor if you are having problems. It's also a good idea to know your test results and keep a list of the medicines you take. How can you care for yourself at home? · Lie down for 1 or 2 minutes when you feel lightheaded. After lying down, sit up slowly and remain sitting for 1 to 2 minutes before slowly standing up. · Avoid movements, positions, or activities that have made you lightheaded in the past.  · Get plenty of rest, especially if you have a cold or flu, which can cause lightheadedness. · Make sure you drink plenty of fluids, especially if you have a fever or have been sweating. · Do not drive or put yourself and others in danger while you feel lightheaded. When should you call for help? Call 911 anytime you think you may need emergency care. For example, call if:    · You have symptoms of a stroke. These may include:  ? Sudden numbness, tingling, weakness, or loss of movement in your face, arm, or leg, especially on only one side of your body. ? Sudden vision changes. ? Sudden trouble speaking.   ? Sudden confusion or trouble understanding simple statements. ? Sudden problems with walking or balance. ? A sudden, severe headache that is different from past headaches.     · You have symptoms of a heart attack. These may include:  ? Chest pain or pressure, or a strange feeling in the chest.  ? Sweating. ? Shortness of breath. ? Nausea or vomiting. ? Pain, pressure, or a strange feeling in the back, neck, jaw, or upper belly or in one or both shoulders or arms. ? Lightheadedness or sudden weakness. ? A fast or irregular heartbeat. After you call 911, the  may tell you to chew 1 adult-strength or 2 to 4 low-dose aspirin. Wait for an ambulance. Do not try to drive yourself.    Watch closely for changes in your health, and be sure to contact your doctor if:    · Your lightheadedness gets worse or does not get better with home care. Where can you learn more? Go to http://terry-carla.info/. Enter J908 in the search box to learn more about \"Lightheadedness or Faintness: Care Instructions. \"  Current as of: June 26, 2019  Content Version: 12.2  © 0737-7826 [employer]. Care instructions adapted under license by [employer] (which disclaims liability or warranty for this information). If you have questions about a medical condition or this instruction, always ask your healthcare professional. [address] any warranty or liability for your use of this information.

## 2022-10-12 NOTE — ROUTINE PROCESS
Bedside and Verbal shift change report given to Sherry Thompson RN (oncoming nurse) by Keron Us RN (offgoing nurse). Report included the following information SBAR, Kardex, Procedure Summary and MAR. Doxycycline Counseling:  Patient counseled regarding possible photosensitivity and increased risk for sunburn.  Patient instructed to avoid sunlight, if possible.  When exposed to sunlight, patients should wear protective clothing, sunglasses, and sunscreen.  The patient was instructed to call the office immediately if the following severe adverse effects occur:  hearing changes, easy bruising/bleeding, severe headache, or vision changes.  The patient verbalized understanding of the proper use and possible adverse effects of doxycycline.  All of the patient's questions and concerns were addressed.

## (undated) DEVICE — FCPS ENDOSCP 5MMX33CM -- ENDOPATH

## (undated) DEVICE — SCISSORS LAPSCP L33CM DIA5MM CVD MULTIUSE HANDHELD

## (undated) DEVICE — Device

## (undated) DEVICE — 3M™ TEGADERM™ TRANSPARENT FILM DRESSING FRAME STYLE, 1626W, 4 IN X 4-3/4 IN (10 CM X 12 CM), 50/CT 4CT/CASE: Brand: 3M™ TEGADERM™

## (undated) DEVICE — 3M™ BAIR PAWS FLEX™ WARMING GOWN, STANDARD, 20 PER CASE 81003: Brand: BAIR PAWS™

## (undated) DEVICE — SUTURE VCRL SZ 3-0 L27IN ABSRB UD L26MM SH 1/2 CIR J416H

## (undated) DEVICE — SOL ANTI-FOG 6ML MEDC -- MEDICHOICE - CONVERT TO 358427

## (undated) DEVICE — MEDI-VAC NON-CONDUCTIVE SUCTION TUBING: Brand: CARDINAL HEALTH

## (undated) DEVICE — AIRLIFE™ ADULT CUSHION NASAL CANNULA 14 FOOT (4.3) CRUSH-RESISTANT OXYGEN TUBING, AND U/CONNECT-IT ADAPTER: Brand: AIRLIFE™

## (undated) DEVICE — KIT CLN UP BON SECOURS MARYV

## (undated) DEVICE — BAG SPEC RETRV 275ML 10ML DISPOSABLE RELIACATCH

## (undated) DEVICE — SOLUTION LACTATED RINGERS INJECTION USP

## (undated) DEVICE — FLEX ADVANTAGE 3000CC: Brand: FLEX ADVANTAGE

## (undated) DEVICE — TUBING IRRIG PRESSURIZED BG SET SPIK PRB + II

## (undated) DEVICE — STERILE POLYISOPRENE POWDER-FREE SURGICAL GLOVES: Brand: PROTEXIS

## (undated) DEVICE — SUTURE VCRL SZ 3-0 L27IN ABSRB VLT CT-2 L26MM 1/2 CIR J332H

## (undated) DEVICE — BLADED TROCAR WITH FIXATION CANNULA: Brand: VERSAONE

## (undated) DEVICE — RELOAD STPL L45MM H15 35MM REG TISS BLU 6 ROW B FRM NAT

## (undated) DEVICE — BLUNT TROCAR WITH THREADED ANCHOR: Brand: VERSAONE

## (undated) DEVICE — DRAIN SURG W10MMXL20CM SIL FULL PERF HUBLESS FLAT RADPQ

## (undated) DEVICE — NEEDLE HYPO 22GA L1.5IN BLK S STL HUB POLYPR SHLD REG BVL

## (undated) DEVICE — CUTTER ENDOSCP L340MM LIN ARTC SGL STROKE FIRING ENDOPATH

## (undated) DEVICE — RELOAD STPL H1-2.5X45MM VASC THN TISS WHT 6 ROW B FRM SGL

## (undated) DEVICE — SUTURE MCRYL SZ 4-0 L18IN ABSRB UD L19MM PS-2 3/8 CIR PRIM Y496G

## (undated) DEVICE — HEX-LOCKING BLADE ELECTRODE: Brand: EDGE

## (undated) DEVICE — REM POLYHESIVE ADULT PATIENT RETURN ELECTRODE: Brand: VALLEYLAB

## (undated) DEVICE — TRAY CATH 16F URIN MTR LTX -- CONVERT TO ITEM 363111

## (undated) DEVICE — (D)STRIP SKN CLSR 0.5X4IN WHT --

## (undated) DEVICE — 3M™ STERI-STRIP™ COMPOUND BENZOIN TINCTURE 40 BAGS/CARTON 4 CARTONS/CASE C1544: Brand: 3M™ STERI-STRIP™

## (undated) DEVICE — TRAY PREP DRY W/ PREM GLV 2 APPL 6 SPNG 2 UNDPD 1 OVERWRAP

## (undated) DEVICE — SUTURE VCRL SZ 0 L27IN ABSRB UD L26MM CT-2 1/2 CIR J270H

## (undated) DEVICE — GAUZE SPONGES,8 PLY: Brand: CURITY

## (undated) DEVICE — (D)BNDG ADHESIVE FABRIC 3/4X3 -- DISC BY MFR USE ITEM 357960

## (undated) DEVICE — STERILE LATEX POWDER-FREE SURGICAL GLOVESWITH NITRILE COATING: Brand: PROTEXIS

## (undated) DEVICE — X-RAY SPONGES,12 PLY: Brand: DERMACEA

## (undated) DEVICE — CORD ES L10FT MPLR LAP

## (undated) DEVICE — SOLUTION IRRIG 1000ML H2O STRL BLT

## (undated) DEVICE — SUTURE ETHLN SZ 2-0 L18IN NONABSORBABLE BLK L19MM PS-2 PRIM 593H